# Patient Record
Sex: MALE | Race: BLACK OR AFRICAN AMERICAN | NOT HISPANIC OR LATINO | Employment: FULL TIME | URBAN - METROPOLITAN AREA
[De-identification: names, ages, dates, MRNs, and addresses within clinical notes are randomized per-mention and may not be internally consistent; named-entity substitution may affect disease eponyms.]

---

## 2019-08-20 ENCOUNTER — HOSPITAL ENCOUNTER (EMERGENCY)
Facility: HOSPITAL | Age: 34
Discharge: HOME | End: 2019-08-20
Attending: EMERGENCY MEDICINE | Admitting: EMERGENCY MEDICINE
Payer: COMMERCIAL

## 2019-08-20 VITALS
WEIGHT: 154.98 LBS | DIASTOLIC BLOOD PRESSURE: 60 MMHG | BODY MASS INDEX: 23.49 KG/M2 | HEART RATE: 70 BPM | TEMPERATURE: 99.8 F | HEIGHT: 68 IN | SYSTOLIC BLOOD PRESSURE: 117 MMHG | OXYGEN SATURATION: 96 % | RESPIRATION RATE: 14 BRPM

## 2019-08-20 DIAGNOSIS — B34.9 VIRAL ILLNESS: ICD-10-CM

## 2019-08-20 DIAGNOSIS — R50.9 FEVER: Primary | ICD-10-CM

## 2019-08-20 LAB
ANION GAP SERPL CALCULATED.3IONS-SCNC: 8 MMOL/L (ref 4–13)
BASOPHILS # BLD AUTO: 0.01 THOUSANDS/ΜL (ref 0–0.1)
BASOPHILS NFR BLD AUTO: 0 % (ref 0–1)
BUN SERPL-MCNC: 19 MG/DL (ref 5–25)
CALCIUM SERPL-MCNC: 9.3 MG/DL (ref 8.3–10.1)
CHLORIDE SERPL-SCNC: 100 MMOL/L (ref 100–108)
CO2 SERPL-SCNC: 30 MMOL/L (ref 21–32)
CREAT SERPL-MCNC: 1.58 MG/DL (ref 0.6–1.3)
EOSINOPHIL # BLD AUTO: 0 THOUSAND/ΜL (ref 0–0.61)
EOSINOPHIL NFR BLD AUTO: 0 % (ref 0–6)
ERYTHROCYTE [DISTWIDTH] IN BLOOD BY AUTOMATED COUNT: 13.8 % (ref 11.6–15.1)
GFR SERPL CREATININE-BSD FRML MDRD: 66 ML/MIN/1.73SQ M
GLUCOSE SERPL-MCNC: 88 MG/DL (ref 65–140)
HCT VFR BLD AUTO: 47.4 % (ref 36.5–49.3)
HGB BLD-MCNC: 17.1 G/DL (ref 12–17)
IMM GRANULOCYTES # BLD AUTO: 0.02 THOUSAND/UL (ref 0–0.2)
IMM GRANULOCYTES NFR BLD AUTO: 0 % (ref 0–2)
LYMPHOCYTES # BLD AUTO: 0.73 THOUSANDS/ΜL (ref 0.6–4.47)
LYMPHOCYTES NFR BLD AUTO: 10 % (ref 14–44)
MCH RBC QN AUTO: 30.1 PG (ref 26.8–34.3)
MCHC RBC AUTO-ENTMCNC: 36.1 G/DL (ref 31.4–37.4)
MCV RBC AUTO: 84 FL (ref 82–98)
MONOCYTES # BLD AUTO: 1.11 THOUSAND/ΜL (ref 0.17–1.22)
MONOCYTES NFR BLD AUTO: 16 % (ref 4–12)
NEUTROPHILS # BLD AUTO: 5.23 THOUSANDS/ΜL (ref 1.85–7.62)
NEUTS SEG NFR BLD AUTO: 74 % (ref 43–75)
NRBC BLD AUTO-RTO: 0 /100 WBCS
PLATELET # BLD AUTO: 130 THOUSANDS/UL (ref 149–390)
PMV BLD AUTO: 12.3 FL (ref 8.9–12.7)
POTASSIUM SERPL-SCNC: 4 MMOL/L (ref 3.5–5.3)
RBC # BLD AUTO: 5.68 MILLION/UL (ref 3.88–5.62)
SODIUM SERPL-SCNC: 138 MMOL/L (ref 136–145)
WBC # BLD AUTO: 7.1 THOUSAND/UL (ref 4.31–10.16)

## 2019-08-20 PROCEDURE — 96360 HYDRATION IV INFUSION INIT: CPT

## 2019-08-20 PROCEDURE — 85025 COMPLETE CBC W/AUTO DIFF WBC: CPT | Performed by: EMERGENCY MEDICINE

## 2019-08-20 PROCEDURE — 99284 EMERGENCY DEPT VISIT MOD MDM: CPT | Performed by: EMERGENCY MEDICINE

## 2019-08-20 PROCEDURE — 36415 COLL VENOUS BLD VENIPUNCTURE: CPT | Performed by: EMERGENCY MEDICINE

## 2019-08-20 PROCEDURE — 99283 EMERGENCY DEPT VISIT LOW MDM: CPT

## 2019-08-20 PROCEDURE — 80048 BASIC METABOLIC PNL TOTAL CA: CPT | Performed by: EMERGENCY MEDICINE

## 2019-08-20 RX ORDER — IBUPROFEN 600 MG/1
600 TABLET ORAL ONCE
Status: COMPLETED | OUTPATIENT
Start: 2019-08-20 | End: 2019-08-20

## 2019-08-20 RX ORDER — ACETAMINOPHEN 325 MG/1
975 TABLET ORAL ONCE
Status: COMPLETED | OUTPATIENT
Start: 2019-08-20 | End: 2019-08-20

## 2019-08-20 RX ADMIN — SODIUM CHLORIDE 1000 ML: 0.9 INJECTION, SOLUTION INTRAVENOUS at 15:58

## 2019-08-20 RX ADMIN — IBUPROFEN 600 MG: 600 TABLET ORAL at 15:58

## 2019-08-20 RX ADMIN — ACETAMINOPHEN 975 MG: 325 TABLET ORAL at 15:58

## 2019-08-20 NOTE — ED PROVIDER NOTES
History  Chief Complaint   Patient presents with    Fever - 9 weeks to 74 years     Patient states he has had a fever on and off since Sunday as high as 103 5  Patient began to complain of b/l ear pain today  30year old male presents to the ED for malaise and fever  The patient came to the ED with concern for otitis as his ears hurt  The patient has normal ear exam   He was evaluated with blood work and iv hydration and will be treated supportively  History provided by:  Patient   used: No    Fever - 9 weeks to 74 years   Temp source:  Subjective and oral  Severity:  Mild  Timing:  Intermittent  Progression:  Waxing and waning  Chronicity:  New  Relieved by:  Nothing  Worsened by:  Nothing  Ineffective treatments:  None tried  Associated symptoms: no chest pain, no confusion, no diarrhea, no dysuria, no rash, no somnolence and no vomiting    Risk factors: no contaminated food, no occupational exposure and no sick contacts        Prior to Admission Medications   Prescriptions Last Dose Informant Patient Reported? Taking? cephalexin (KEFLEX) 500 mg capsule   No No   Sig: Take 1 capsule by mouth 4 (four) times a day      Facility-Administered Medications: None       History reviewed  No pertinent past medical history  Past Surgical History:   Procedure Laterality Date    CYST REMOVAL         History reviewed  No pertinent family history  I have reviewed and agree with the history as documented  Social History     Tobacco Use    Smoking status: Never Smoker    Smokeless tobacco: Never Used   Substance Use Topics    Alcohol use: No    Drug use: No        Review of Systems   Constitutional: Positive for fever  Cardiovascular: Negative for chest pain  Gastrointestinal: Negative for diarrhea and vomiting  Genitourinary: Negative for dysuria  Skin: Negative for rash  Psychiatric/Behavioral: Negative for confusion     All other systems reviewed and are negative  Physical Exam  Physical Exam   Constitutional: He is oriented to person, place, and time  He appears well-developed and well-nourished  No distress  HENT:   Head: Normocephalic and atraumatic  Right Ear: External ear normal    Left Ear: External ear normal    Eyes: Conjunctivae and EOM are normal  Right eye exhibits no discharge  Left eye exhibits no discharge  No scleral icterus  Neck: Normal range of motion  Neck supple  No JVD present  No tracheal deviation present  No thyromegaly present  Cardiovascular: Normal rate and regular rhythm  Pulmonary/Chest: Effort normal and breath sounds normal  No stridor  No respiratory distress  He has no wheezes  He has no rales  Abdominal: Soft  Bowel sounds are normal  He exhibits no distension  There is no tenderness  Musculoskeletal: Normal range of motion  He exhibits no edema, tenderness or deformity  Neurological: He is alert and oriented to person, place, and time  No cranial nerve deficit  Coordination normal    Skin: Skin is warm and dry  He is not diaphoretic  Psychiatric: He has a normal mood and affect  His behavior is normal    Nursing note and vitals reviewed        Vital Signs  ED Triage Vitals   Temperature Pulse Respirations Blood Pressure SpO2   08/20/19 1415 08/20/19 1415 08/20/19 1415 08/20/19 1415 08/20/19 1415   100 3 °F (37 9 °C) 95 16 126/70 96 %      Temp Source Heart Rate Source Patient Position - Orthostatic VS BP Location FiO2 (%)   08/20/19 1415 08/20/19 1415 08/20/19 1516 08/20/19 1415 --   Oral Monitor Sitting Left arm       Pain Score       --                  Vitals:    08/20/19 1415 08/20/19 1516 08/20/19 1722   BP: 126/70 118/69 117/60   Pulse: 95 91 70   Patient Position - Orthostatic VS:  Sitting Lying         Visual Acuity      ED Medications  Medications   sodium chloride 0 9 % bolus 1,000 mL (0 mL Intravenous Stopped 8/20/19 1658)   acetaminophen (TYLENOL) tablet 975 mg (975 mg Oral Given 8/20/19 1558) ibuprofen (MOTRIN) tablet 600 mg (600 mg Oral Given 8/20/19 2308)       Diagnostic Studies  Results Reviewed     Procedure Component Value Units Date/Time    Basic metabolic panel [[id number]]  (Abnormal) Collected:  08/20/19 1556    Lab Status:  Final result Specimen:  Blood from Arm, Right Updated:  08/20/19 1614     Sodium 138 mmol/L      Potassium 4 0 mmol/L      Chloride 100 mmol/L      CO2 30 mmol/L      ANION GAP 8 mmol/L      BUN 19 mg/dL      Creatinine 1 58 mg/dL      Glucose 88 mg/dL      Calcium 9 3 mg/dL      eGFR 66 ml/min/1 73sq m     Narrative:       [place] guidelines for Chronic Kidney Disease (CKD):     Stage 1 with normal or high GFR (GFR > 90 mL/min/1 73 square meters)    Stage 2 Mild CKD (GFR = 60-89 mL/min/1 73 square meters)    Stage 3A Moderate CKD (GFR = 45-59 mL/min/1 73 square meters)    Stage 3B Moderate CKD (GFR = 30-44 mL/min/1 73 square meters)    Stage 4 Severe CKD (GFR = 15-29 mL/min/1 73 square meters)    Stage 5 End Stage CKD (GFR <15 mL/min/1 73 square meters)  Note: GFR calculation is accurate only with a steady state creatinine    CBC and differential [[id number]]  (Abnormal) Collected:  08/20/19 1556    Lab Status:  Final result Specimen:  Blood from Arm, Right Updated:  08/20/19 1607     WBC 7 10 Thousand/uL      RBC 5 68 Million/uL      Hemoglobin 17 1 g/dL      Hematocrit 47 4 %      MCV 84 fL      MCH 30 1 pg      MCHC 36 1 g/dL      RDW 13 8 %      MPV 12 3 fL      Platelets 157 Thousands/uL      nRBC 0 /100 WBCs      Neutrophils Relative 74 %      Immat GRANS % 0 %      Lymphocytes Relative 10 %      Monocytes Relative 16 %      Eosinophils Relative 0 %      Basophils Relative 0 %      Neutrophils Absolute 5 23 Thousands/µL      Immature Grans Absolute 0 02 Thousand/uL      Lymphocytes Absolute 0 73 Thousands/µL      Monocytes Absolute 1 11 Thousand/µL      Eosinophils Absolute 0 00 Thousand/µL      Basophils Absolute 0 01 Thousands/µL No orders to display              Procedures  Procedures       ED Course                               MDM  Number of Diagnoses or Management Options  Fever: new and requires workup  Viral illness: new and requires workup     Amount and/or Complexity of Data Reviewed  Clinical lab tests: ordered and reviewed  Decide to obtain previous medical records or to obtain history from someone other than the patient: yes  Review and summarize past medical records: yes    Patient Progress  Patient progress: stable      Disposition  Final diagnoses:   Fever   Viral illness     Time reflects when diagnosis was documented in both MDM as applicable and the Disposition within this note     Time User Action Codes Description Comment    8/20/2019  4:16 PM Rin Ennis [R50 9] Fever     8/20/2019  4:16 PM Rin Ennis [B34 9] Viral illness       ED Disposition     ED Disposition Condition Date/Time Comment    Discharge Stable Tue Aug 20, 2019  4:16 PM Tae Robles discharge to home/self care  Follow-up Information     Follow up With Specialties Details Why Contact Info    Allison Pedroza MD [facility] Medicine   [address]  [place] [address]  [phone]            Discharge Medication List as of 8/20/2019  4:16 PM      CONTINUE these medications which have NOT CHANGED    Details   cephalexin (KEFLEX) 500 mg capsule Take 1 capsule by mouth 4 (four) times a day, Starting 11/1/2016, Until Discontinued, Print           No discharge procedures on file      ED Provider  Electronically Signed by           Manpreet Holden DO  08/22/19 5207

## 2019-08-20 NOTE — ED NOTES
Patient resting in bed eyes closed resp easy non labored       Raquel Brewster, HOLLIE  08/20/19 8306

## 2020-08-06 ENCOUNTER — HOSPITAL ENCOUNTER (EMERGENCY)
Facility: HOSPITAL | Age: 35
Discharge: HOME | End: 2020-08-06
Attending: EMERGENCY MEDICINE
Payer: COMMERCIAL

## 2020-08-06 ENCOUNTER — APPOINTMENT (EMERGENCY)
Dept: RADIOLOGY | Facility: HOSPITAL | Age: 35
End: 2020-08-06

## 2020-08-06 VITALS
HEART RATE: 57 BPM | OXYGEN SATURATION: 96 % | DIASTOLIC BLOOD PRESSURE: 61 MMHG | WEIGHT: 165 LBS | BODY MASS INDEX: 25.09 KG/M2 | SYSTOLIC BLOOD PRESSURE: 136 MMHG | TEMPERATURE: 97.9 F | RESPIRATION RATE: 15 BRPM

## 2020-08-06 DIAGNOSIS — S39.012A STRAIN OF LUMBAR PARASPINOUS MUSCLE, INITIAL ENCOUNTER: Primary | ICD-10-CM

## 2020-08-06 PROCEDURE — 72100 X-RAY EXAM L-S SPINE 2/3 VWS: CPT

## 2020-08-06 PROCEDURE — 99283 EMERGENCY DEPT VISIT LOW MDM: CPT

## 2020-08-06 PROCEDURE — 99282 EMERGENCY DEPT VISIT SF MDM: CPT | Performed by: PHYSICIAN ASSISTANT

## 2020-08-06 NOTE — DISCHARGE INSTRUCTIONS
Take Tylenol 650mg and ibuprofen 600mg every 6 hours as needed for pain  Use lidocaine patches daily (12 hours on, 12 hours off)  Take your muscle relaxer as needed for spasms  Follow-up with your workman's comp occupational health resources  Return to ER with any worsening symptoms
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
## 2020-08-06 NOTE — Clinical Note
Rich Griffin was seen and treated in our emergency department on 8/6/2020  Diagnosis:     Britany Owens  may return to work on return date  He may return on 08/10/2020  If you have any questions or concerns, please don't hesitate to call        Mor Puente PA-C    ______________________________           _______________          _______________  Hospital Representative                              Date                                Time

## 2022-12-11 ENCOUNTER — HOSPITAL ENCOUNTER (EMERGENCY)
Facility: HOSPITAL | Age: 37
Discharge: HOME | End: 2022-12-11
Attending: EMERGENCY MEDICINE

## 2022-12-11 VITALS
RESPIRATION RATE: 18 BRPM | DIASTOLIC BLOOD PRESSURE: 56 MMHG | OXYGEN SATURATION: 98 % | HEART RATE: 62 BPM | TEMPERATURE: 97.8 F | SYSTOLIC BLOOD PRESSURE: 116 MMHG

## 2022-12-11 DIAGNOSIS — H61.892 SWELLING OF EXTERNAL EAR, LEFT: Primary | ICD-10-CM

## 2022-12-11 RX ORDER — LIDOCAINE 40 MG/G
CREAM TOPICAL ONCE
Status: COMPLETED | OUTPATIENT
Start: 2022-12-11 | End: 2022-12-11

## 2022-12-11 RX ORDER — CEPHALEXIN 250 MG/1
500 CAPSULE ORAL ONCE
Status: COMPLETED | OUTPATIENT
Start: 2022-12-11 | End: 2022-12-11

## 2022-12-11 RX ORDER — CEPHALEXIN 500 MG/1
500 CAPSULE ORAL EVERY 6 HOURS SCHEDULED
Qty: 20 CAPSULE | Refills: 0 | Status: SHIPPED | OUTPATIENT
Start: 2022-12-11 | End: 2022-12-11 | Stop reason: SDUPTHER

## 2022-12-11 RX ORDER — CEPHALEXIN 500 MG/1
500 CAPSULE ORAL EVERY 6 HOURS SCHEDULED
Qty: 20 CAPSULE | Refills: 0 | Status: SHIPPED | OUTPATIENT
Start: 2022-12-11 | End: 2022-12-16

## 2022-12-11 RX ADMIN — LIDOCAINE: 40 CREAM TOPICAL at 22:31

## 2022-12-11 RX ADMIN — CEPHALEXIN 500 MG: 250 CAPSULE ORAL at 23:00

## 2022-12-12 NOTE — ED PROVIDER NOTES
History  Chief Complaint   Patient presents with   • Medical Problem     Pt reports ambulatory to triage with a  c/o left ear swelling, to the top part of his ear for 3 weeks  Denies known injury  The patient is a 30 y o  male with no significant past medical history who presents for evaluation of left ear swelling  He reports he first noticed a small bump in the area approximately 3 weeks ago and it has continued to grow in size  It is minimally painful when touching/squeezing the area  Denies F/C, redness, drainage, itching, LAD, injury to the ear, or recent ear piercing  Prior to Admission Medications   Prescriptions Last Dose Informant Patient Reported? Taking? cephalexin (KEFLEX) 500 mg capsule   No No   Sig: Take 1 capsule by mouth 4 (four) times a day      Facility-Administered Medications: None       History reviewed  No pertinent past medical history  Past Surgical History:   Procedure Laterality Date   • CYST REMOVAL         History reviewed  No pertinent family history  I have reviewed and agree with the history as documented  E-Cigarette/Vaping   • E-Cigarette Use Never User      E-Cigarette/Vaping Substances   • Nicotine No    • THC No    • CBD No    • Flavoring No    • Other No    • Unknown No      Social History     Tobacco Use   • Smoking status: Never   • Smokeless tobacco: Never   Vaping Use   • Vaping Use: Never used   Substance Use Topics   • Alcohol use: No   • Drug use: No       Review of Systems   Constitutional: Negative for chills and fever  HENT: Positive for ear pain  Negative for congestion, ear discharge, facial swelling, rhinorrhea and sore throat  Eyes: Negative for pain and visual disturbance  Respiratory: Negative for cough and shortness of breath  Cardiovascular: Negative for chest pain and palpitations  Gastrointestinal: Negative for abdominal pain, constipation, diarrhea, nausea and vomiting  Genitourinary: Negative for dysuria and hematuria  Musculoskeletal: Negative for arthralgias, back pain, myalgias, neck pain and neck stiffness  Skin: Negative for color change, rash and wound  Neurological: Negative for seizures and syncope  All other systems reviewed and are negative  Physical Exam  Physical Exam  Vitals and nursing note reviewed  Constitutional:       General: He is awake  He is not in acute distress  Appearance: Normal appearance  He is well-developed and normal weight  He is not ill-appearing  HENT:      Head: Normocephalic and atraumatic  Right Ear: Hearing, ear canal and external ear normal  There is impacted cerumen  Left Ear: Hearing, tympanic membrane and ear canal normal       Ears:        Comments: Large amount of cerumen in the EACs bilaterally, right EAC is almost completely occluded and the TM is not well visualized  Nose: Nose normal  No congestion or rhinorrhea  Right Nostril: No occlusion  Left Nostril: No occlusion  Mouth/Throat:      Lips: Pink  No lesions  Mouth: Mucous membranes are moist       Pharynx: Oropharynx is clear  Uvula midline  Eyes:      General: Lids are normal  Gaze aligned appropriately  Conjunctiva/sclera: Conjunctivae normal       Pupils: Pupils are equal, round, and reactive to light  Cardiovascular:      Rate and Rhythm: Normal rate and regular rhythm  Pulmonary:      Effort: Pulmonary effort is normal  No respiratory distress  Musculoskeletal:      Cervical back: Full passive range of motion without pain and neck supple  Lymphadenopathy:      Head:      Right side of head: No preauricular or posterior auricular adenopathy  Left side of head: No preauricular or posterior auricular adenopathy  Cervical: No cervical adenopathy  Skin:     General: Skin is warm  Capillary Refill: Capillary refill takes less than 2 seconds  Coloration: Skin is not cyanotic, jaundiced or pale        Findings: No erythema, lesion, petechiae, rash or wound  Neurological:      Mental Status: He is alert and oriented to person, place, and time  Psychiatric:         Attention and Perception: Attention normal          Mood and Affect: Mood normal          Speech: Speech normal          Behavior: Behavior normal  Behavior is cooperative  Vital Signs  ED Triage Vitals [12/11/22 2109]   Temperature Pulse Respirations Blood Pressure SpO2   97 8 °F (36 6 °C) 62 18 116/56 98 %      Temp Source Heart Rate Source Patient Position - Orthostatic VS BP Location FiO2 (%)   Tympanic Monitor Sitting Left arm --      Pain Score       --           Vitals:    12/11/22 2109   BP: 116/56   Pulse: 62   Patient Position - Orthostatic VS: Sitting       ED Medications  Medications   lidocaine (LMX) 4 % cream ( Topical Given 12/11/22 2231)   cephalexin (KEFLEX) capsule 500 mg (500 mg Oral Given 12/11/22 2300)       Diagnostic Studies  Results Reviewed     None                 No orders to display              Procedures  Incision and drain    Date/Time: 12/11/2022 10:42 PM  Performed by: Gil Palumbo PA-C  Authorized by: Gil Palumbo PA-C   Universal Protocol:  Procedure performed by:  Risks and benefits: risks, benefits and alternatives were discussed  Consent given by: patient  Time out: Immediately prior to procedure a "time out" was called to verify the correct patient, procedure, equipment, support staff and site/side marked as required    Timeout called at: 12/11/2022 10:42 PM   Patient understanding: patient states understanding of the procedure being performed  Patient consent: the patient's understanding of the procedure matches consent given  Procedure consent: procedure consent matches procedure scheduled  Patient identity confirmed: verbally with patient and arm band      Patient location:  ED  Location:     Type:  Fluid collection    Size:  2 cm x 2 cm    Location:  Head/neck    Head/neck location:  L external ear  Pre-procedure details:     Skin preparation:  Betadine  Anesthesia (see MAR for exact dosages): Anesthesia method:  None  Procedure details:     Complexity:  Simple    Incision types: Other (comment) (Needle decompression)    Aspiration type: puncture aspiration      Aspiration type comment:  18 gauge needle    Drainage:  Serosanguinous    Drainage amount:  Scant    Wound treatment:  Wound left open    Packing materials:  None  Post-procedure details:     Patient tolerance of procedure: Tolerated well, no immediate complications         ED Course         MDM  Number of Diagnoses or Management Options  Swelling of external ear, left: new and does not require workup  Diagnosis management comments: Patient presents for evaluation of left ear swelling  On exam there is a minimally tender 2cm x 2cm area of fluctuance in the antihelix, extending into the triangular fossa  The area is not red or warm and no purulent drainage was expressed on palpation  Differential diagnosis includes but is not limited to cyst, perichondritis, cellulitis, or an auricular hematoma  Topical lidocaine was applied to the area and then the area was drained with an 18-gauge needle  Patient tolerated the procedure well  He was placed on antibiotic prophylaxis and referred to ENT for further management  All of the patient and his wife's questions were answered  Strict return precautions discussed and he verbalized understanding  Follow up with ENT         Amount and/or Complexity of Data Reviewed  Discuss the patient with other providers: yes    Risk of Complications, Morbidity, and/or Mortality  Presenting problems: low  Diagnostic procedures: minimal  Management options: low    Patient Progress  Patient progress: stable      Disposition  Final diagnoses:   Swelling of external ear, left     Time reflects when diagnosis was documented in both MDM as applicable and the Disposition within this note     Time User Action Codes Description Comment    12/11/2022 10:47 PM Amanda Linares [facility] Add [[id number] [id number]] Swelling of external ear, left       ED Disposition     ED Disposition   Discharge    Condition   Stable    Date/Time   Sun Dec 11, 2022 10:47 PM    Comment   Larry Traylor discharge to home/self care  Follow-up Information     Follow up With Specialties Details Why Contact Info    Ambrocio Jacobson MD [facility]   [address]  [place] [id number] [phone]      Omid Griffiths MD Otolaryngology   [address] [place] [id number]  [phone]            Discharge Medication List as of 12/11/2022 10:56 PM      CONTINUE these medications which have CHANGED    Details   !! cephalexin (KEFLEX) 500 mg capsule Take 1 capsule (500 mg total) by mouth every 6 (six) hours for 5 days, Starting Sun 12/11/2022, Until Fri 12/16/2022, Normal       !! - Potential duplicate medications found  Please discuss with provider  CONTINUE these medications which have NOT CHANGED    Details   !! cephalexin (KEFLEX) 500 mg capsule Take 1 capsule by mouth 4 (four) times a day, Starting 11/1/2016, Until Discontinued, Print       !! - Potential duplicate medications found  Please discuss with provider                PDMP Review     None          ED Provider  Electronically Signed by           Chava Pavon PA-C  12/14/22 0265

## 2023-01-10 ENCOUNTER — HOSPITAL ENCOUNTER (EMERGENCY)
Facility: HOSPITAL | Age: 38
Discharge: HOME | End: 2023-01-10
Attending: EMERGENCY MEDICINE

## 2023-01-10 VITALS
WEIGHT: 165 LBS | DIASTOLIC BLOOD PRESSURE: 70 MMHG | OXYGEN SATURATION: 99 % | RESPIRATION RATE: 16 BRPM | TEMPERATURE: 98.2 F | BODY MASS INDEX: 25.09 KG/M2 | HEART RATE: 79 BPM | SYSTOLIC BLOOD PRESSURE: 117 MMHG

## 2023-01-10 DIAGNOSIS — M25.521 ELBOW PAIN, RIGHT: Primary | ICD-10-CM

## 2023-01-10 DIAGNOSIS — M70.21 OLECRANON BURSITIS OF RIGHT ELBOW: ICD-10-CM

## 2023-01-10 RX ORDER — AMOXICILLIN AND CLAVULANATE POTASSIUM 875; 125 MG/1; MG/1
1 TABLET, FILM COATED ORAL EVERY 12 HOURS
Qty: 14 TABLET | Refills: 0 | Status: SHIPPED | OUTPATIENT
Start: 2023-01-10 | End: 2023-01-17

## 2023-01-10 RX ORDER — PREDNISONE 20 MG/1
40 TABLET ORAL DAILY
Qty: 10 TABLET | Refills: 0 | Status: SHIPPED | OUTPATIENT
Start: 2023-01-10

## 2023-01-10 NOTE — ED PROVIDER NOTES
History  Chief Complaint   Patient presents with   • Elbow Pain     Pt c/o R elbow pain since this morning that radiates down the arm  Pt states got a burn a few weeks ago on the arm, but has since healed  Elbow is painful to touch, warm and swollen     RHD  who woke up this morning with R elbow pain, swelling, warmth and redness  Pain posterior to elbow worsened with touch  No pain within the elbow joint with flexion, extension, pronation or supination  No systemic illness  Prior to Admission Medications   Prescriptions Last Dose Informant Patient Reported? Taking? cephalexin (KEFLEX) 500 mg capsule   No No   Sig: Take 1 capsule by mouth 4 (four) times a day      Facility-Administered Medications: None       History reviewed  No pertinent past medical history  Past Surgical History:   Procedure Laterality Date   • CYST REMOVAL         History reviewed  No pertinent family history  I have reviewed and agree with the history as documented  E-Cigarette/Vaping   • E-Cigarette Use Never User      E-Cigarette/Vaping Substances   • Nicotine No    • THC No    • CBD No    • Flavoring No    • Other No    • Unknown No      Social History     Tobacco Use   • Smoking status: Never   • Smokeless tobacco: Never   Vaping Use   • Vaping Use: Never used   Substance Use Topics   • Alcohol use: No   • Drug use: No       Review of Systems   Musculoskeletal: Positive for arthralgias  All other systems reviewed and are negative  Physical Exam  Physical Exam  Vitals and nursing note reviewed  Constitutional:       General: He is not in acute distress  Appearance: Normal appearance  He is well-developed  He is not ill-appearing, toxic-appearing or diaphoretic  HENT:      Head: Normocephalic and atraumatic  Eyes:      General:         Right eye: No discharge  Left eye: No discharge  Conjunctiva/sclera: Conjunctivae normal       Pupils: Pupils are equal, round, and reactive to light  Neck:      Vascular: No JVD  Cardiovascular:      Pulses: Normal pulses  Pulmonary:      Breath sounds: No stridor  Musculoskeletal:         General: Swelling and tenderness present  No deformity  Normal range of motion  Cervical back: Normal range of motion and neck supple  Comments: Posterior to elbow there is erythema and warmth of the skin without crepitus  Centrally in this area there are two small 2-3mm superficial ulcerations of the skin without drainage  Painless active ROM of the R elbow  Skin:     General: Skin is warm and dry  Capillary Refill: Capillary refill takes less than 2 seconds  Coloration: Skin is not pale  Findings: No erythema or rash  Neurological:      General: No focal deficit present  Mental Status: He is alert and oriented to person, place, and time  Cranial Nerves: No cranial nerve deficit  Sensory: No sensory deficit  Motor: No weakness or abnormal muscle tone        Coordination: Coordination normal          Vital Signs  ED Triage Vitals [01/10/23 0856]   Temperature Pulse Respirations Blood Pressure SpO2   98 2 °F (36 8 °C) 79 16 117/70 99 %      Temp Source Heart Rate Source Patient Position - Orthostatic VS BP Location FiO2 (%)   Oral Monitor Sitting Left arm --      Pain Score       7           Vitals:    01/10/23 0856   BP: 117/70   Pulse: 79   Patient Position - Orthostatic VS: Sitting         Visual Acuity      ED Medications  Medications - No data to display    Diagnostic Studies  Results Reviewed     None                 No orders to display              Procedures  Procedures         ED Course                                             MDM    Disposition  Final diagnoses:   Elbow pain, right   Olecranon bursitis of right elbow     Time reflects when diagnosis was documented in both MDM as applicable and the Disposition within this note     Time User Action Codes Description Comment    1/10/2023  9:06 AM Sydni Frances Add [M25 [id number]] Elbow pain, right     1/10/2023  9:06 AM Brayan Adams Add [M70 21] Olecranon bursitis of right elbow       ED Disposition     ED Disposition   Discharge    Condition   Stable    Date/Time   Tue Jos 10, 2023  9:06 AM    Shavonne Edwards discharge to home/self care                 Follow-up Information     Follow up With Specialties Details Why Contact Info Additional Information    [facility] Emergency Medicine  If symptoms worsen [address] [place] [address] Emergency Department, [address] [place] [address], [facility]          Patient's Medications   Discharge Prescriptions    AMOXICILLIN-CLAVULANATE (AUGMENTIN) 875-125 MG PER TABLET    Take 1 tablet by mouth every 12 (twelve) hours for 7 days       Start Date: 1/10/2023 End Date: 1/17/2023       Order Dose: 1 tablet       Quantity: 14 tablet    Refills: 0    PREDNISONE 20 MG TABLET    Take 2 tablets (40 mg total) by mouth daily       Start Date: 1/10/2023 End Date: --       Order Dose: 40 mg       Quantity: 10 tablet    Refills: 0           PDMP Review     None          ED Provider  Electronically Signed by           Katarzyna Ng MD  01/10/23 7532

## 2025-02-14 ENCOUNTER — HOSPITAL ENCOUNTER (OUTPATIENT)
Facility: HOSPITAL | Age: 40
Setting detail: OUTPATIENT SURGERY
Discharge: HOME | End: 2025-02-15
Attending: SURGERY | Admitting: SURGERY
Payer: COMMERCIAL

## 2025-02-14 ENCOUNTER — ANESTHESIA (EMERGENCY)
Dept: PERIOP | Facility: HOSPITAL | Age: 40
End: 2025-02-14
Payer: COMMERCIAL

## 2025-02-14 ENCOUNTER — HOSPITAL ENCOUNTER (EMERGENCY)
Facility: HOSPITAL | Age: 40
End: 2025-02-14
Attending: EMERGENCY MEDICINE
Payer: COMMERCIAL

## 2025-02-14 ENCOUNTER — ANESTHESIA EVENT (EMERGENCY)
Dept: PERIOP | Facility: HOSPITAL | Age: 40
End: 2025-02-14
Payer: COMMERCIAL

## 2025-02-14 VITALS
OXYGEN SATURATION: 100 % | HEIGHT: 67 IN | DIASTOLIC BLOOD PRESSURE: 70 MMHG | HEART RATE: 64 BPM | BODY MASS INDEX: 25.43 KG/M2 | TEMPERATURE: 97.7 F | RESPIRATION RATE: 17 BRPM | SYSTOLIC BLOOD PRESSURE: 146 MMHG | WEIGHT: 162 LBS

## 2025-02-14 DIAGNOSIS — E87.6 HYPOKALEMIA: ICD-10-CM

## 2025-02-14 DIAGNOSIS — K62.3 RECTAL PROLAPSE: Primary | ICD-10-CM

## 2025-02-14 LAB
ABO GROUP BLD: NORMAL
ABO GROUP BLD: NORMAL
ANION GAP SERPL CALCULATED.3IONS-SCNC: 6 MMOL/L (ref 4–13)
BASOPHILS # BLD MANUAL: 0 THOUSAND/UL (ref 0–0.1)
BASOPHILS NFR MAR MANUAL: 0 % (ref 0–1)
BLD GP AB SCN SERPL QL: NEGATIVE
BUN SERPL-MCNC: 18 MG/DL (ref 5–25)
CALCIUM SERPL-MCNC: 8.7 MG/DL (ref 8.4–10.2)
CHLORIDE SERPL-SCNC: 108 MMOL/L (ref 96–108)
CO2 SERPL-SCNC: 28 MMOL/L (ref 21–32)
CREAT SERPL-MCNC: 1.16 MG/DL (ref 0.6–1.3)
EOSINOPHIL # BLD MANUAL: 0.17 THOUSAND/UL (ref 0–0.4)
EOSINOPHIL NFR BLD MANUAL: 2 % (ref 0–6)
ERYTHROCYTE [DISTWIDTH] IN BLOOD BY AUTOMATED COUNT: 13.5 % (ref 11.6–15.1)
GFR SERPL CREATININE-BSD FRML MDRD: 80 ML/MIN/1.73SQ M
GLUCOSE SERPL-MCNC: 103 MG/DL (ref 65–140)
HCT VFR BLD AUTO: 39.8 % (ref 36.5–49.3)
HGB BLD-MCNC: 14.2 G/DL (ref 12–17)
LYMPHOCYTES # BLD AUTO: 3.23 THOUSAND/UL (ref 0.6–4.47)
LYMPHOCYTES # BLD AUTO: 37 % (ref 14–44)
MCH RBC QN AUTO: 29.4 PG (ref 26.8–34.3)
MCHC RBC AUTO-ENTMCNC: 35.7 G/DL (ref 31.4–37.4)
MCV RBC AUTO: 82 FL (ref 82–98)
MONOCYTES # BLD AUTO: 0.42 THOUSAND/UL (ref 0–1.22)
MONOCYTES NFR BLD: 5 % (ref 4–12)
NEUTROPHILS # BLD MANUAL: 4.67 THOUSAND/UL (ref 1.85–7.62)
NEUTS SEG NFR BLD AUTO: 55 % (ref 43–75)
PLATELET # BLD AUTO: 157 THOUSANDS/UL (ref 149–390)
PLATELET BLD QL SMEAR: ADEQUATE
PMV BLD AUTO: 11.4 FL (ref 8.9–12.7)
POTASSIUM SERPL-SCNC: 3.2 MMOL/L (ref 3.5–5.3)
RBC # BLD AUTO: 4.83 MILLION/UL (ref 3.88–5.62)
RBC MORPH BLD: NORMAL
RH BLD: POSITIVE
RH BLD: POSITIVE
SODIUM SERPL-SCNC: 142 MMOL/L (ref 135–147)
SPECIMEN EXPIRATION DATE: NORMAL
VARIANT LYMPHS # BLD AUTO: 1 %
WBC # BLD AUTO: 8.49 THOUSAND/UL (ref 4.31–10.16)

## 2025-02-14 PROCEDURE — 86901 BLOOD TYPING SEROLOGIC RH(D): CPT | Performed by: PHYSICIAN ASSISTANT

## 2025-02-14 PROCEDURE — 99283 EMERGENCY DEPT VISIT LOW MDM: CPT

## 2025-02-14 PROCEDURE — 80048 BASIC METABOLIC PNL TOTAL CA: CPT | Performed by: PHYSICIAN ASSISTANT

## 2025-02-14 PROCEDURE — C1765 ADHESION BARRIER: HCPCS | Performed by: SURGERY

## 2025-02-14 PROCEDURE — 45900 REDUCTION OF RECTAL PROLAPSE: CPT | Performed by: SURGERY

## 2025-02-14 PROCEDURE — 85027 COMPLETE CBC AUTOMATED: CPT | Performed by: PHYSICIAN ASSISTANT

## 2025-02-14 PROCEDURE — 36415 COLL VENOUS BLD VENIPUNCTURE: CPT | Performed by: PHYSICIAN ASSISTANT

## 2025-02-14 PROCEDURE — 96374 THER/PROPH/DIAG INJ IV PUSH: CPT

## 2025-02-14 PROCEDURE — 99285 EMERGENCY DEPT VISIT HI MDM: CPT | Performed by: EMERGENCY MEDICINE

## 2025-02-14 PROCEDURE — 86900 BLOOD TYPING SEROLOGIC ABO: CPT | Performed by: PHYSICIAN ASSISTANT

## 2025-02-14 PROCEDURE — 96375 TX/PRO/DX INJ NEW DRUG ADDON: CPT

## 2025-02-14 PROCEDURE — NC001 PR NO CHARGE: Performed by: SURGERY

## 2025-02-14 PROCEDURE — 86850 RBC ANTIBODY SCREEN: CPT | Performed by: PHYSICIAN ASSISTANT

## 2025-02-14 PROCEDURE — 96365 THER/PROPH/DIAG IV INF INIT: CPT

## 2025-02-14 PROCEDURE — 85007 BL SMEAR W/DIFF WBC COUNT: CPT | Performed by: PHYSICIAN ASSISTANT

## 2025-02-14 PROCEDURE — 96366 THER/PROPH/DIAG IV INF ADDON: CPT

## 2025-02-14 PROCEDURE — 96367 TX/PROPH/DG ADDL SEQ IV INF: CPT

## 2025-02-14 RX ORDER — POTASSIUM CHLORIDE 1500 MG/1
40 TABLET, EXTENDED RELEASE ORAL ONCE
Status: COMPLETED | OUTPATIENT
Start: 2025-02-14 | End: 2025-02-14

## 2025-02-14 RX ORDER — ONDANSETRON 2 MG/ML
INJECTION INTRAMUSCULAR; INTRAVENOUS AS NEEDED
Status: DISCONTINUED | OUTPATIENT
Start: 2025-02-14 | End: 2025-02-14

## 2025-02-14 RX ORDER — ACETAMINOPHEN 325 MG/1
650 TABLET ORAL EVERY 6 HOURS SCHEDULED
Status: DISCONTINUED | OUTPATIENT
Start: 2025-02-14 | End: 2025-02-14

## 2025-02-14 RX ORDER — OXYCODONE HYDROCHLORIDE 10 MG/1
10 TABLET ORAL EVERY 4 HOURS PRN
Refills: 0 | Status: DISCONTINUED | OUTPATIENT
Start: 2025-02-14 | End: 2025-02-14

## 2025-02-14 RX ORDER — ONDANSETRON 2 MG/ML
4 INJECTION INTRAMUSCULAR; INTRAVENOUS ONCE
Status: COMPLETED | OUTPATIENT
Start: 2025-02-14 | End: 2025-02-14

## 2025-02-14 RX ORDER — HYDROMORPHONE HCL/PF 1 MG/ML
0.5 SYRINGE (ML) INJECTION ONCE
Status: COMPLETED | OUTPATIENT
Start: 2025-02-14 | End: 2025-02-14

## 2025-02-14 RX ORDER — SODIUM CHLORIDE, SODIUM LACTATE, POTASSIUM CHLORIDE, CALCIUM CHLORIDE 600; 310; 30; 20 MG/100ML; MG/100ML; MG/100ML; MG/100ML
125 INJECTION, SOLUTION INTRAVENOUS CONTINUOUS
Status: DISCONTINUED | OUTPATIENT
Start: 2025-02-14 | End: 2025-02-14

## 2025-02-14 RX ORDER — LIDOCAINE HYDROCHLORIDE 10 MG/ML
INJECTION, SOLUTION EPIDURAL; INFILTRATION; INTRACAUDAL; PERINEURAL AS NEEDED
Status: DISCONTINUED | OUTPATIENT
Start: 2025-02-14 | End: 2025-02-14 | Stop reason: HOSPADM

## 2025-02-14 RX ORDER — HEPARIN SODIUM 5000 [USP'U]/ML
5000 INJECTION, SOLUTION INTRAVENOUS; SUBCUTANEOUS EVERY 8 HOURS SCHEDULED
Status: DISCONTINUED | OUTPATIENT
Start: 2025-02-14 | End: 2025-02-15 | Stop reason: HOSPADM

## 2025-02-14 RX ORDER — ONDANSETRON 2 MG/ML
4 INJECTION INTRAMUSCULAR; INTRAVENOUS ONCE AS NEEDED
Status: DISCONTINUED | OUTPATIENT
Start: 2025-02-14 | End: 2025-02-14 | Stop reason: HOSPADM

## 2025-02-14 RX ORDER — FENTANYL CITRATE 50 UG/ML
INJECTION, SOLUTION INTRAMUSCULAR; INTRAVENOUS AS NEEDED
Status: DISCONTINUED | OUTPATIENT
Start: 2025-02-14 | End: 2025-02-14

## 2025-02-14 RX ORDER — ACETAMINOPHEN 10 MG/ML
1000 INJECTION, SOLUTION INTRAVENOUS ONCE
Status: COMPLETED | OUTPATIENT
Start: 2025-02-14 | End: 2025-02-14

## 2025-02-14 RX ORDER — LIDOCAINE HYDROCHLORIDE 10 MG/ML
INJECTION, SOLUTION EPIDURAL; INFILTRATION; INTRACAUDAL; PERINEURAL AS NEEDED
Status: DISCONTINUED | OUTPATIENT
Start: 2025-02-14 | End: 2025-02-14

## 2025-02-14 RX ORDER — HYDROMORPHONE HCL IN WATER/PF 6 MG/30 ML
0.2 PATIENT CONTROLLED ANALGESIA SYRINGE INTRAVENOUS
Status: DISCONTINUED | OUTPATIENT
Start: 2025-02-14 | End: 2025-02-14

## 2025-02-14 RX ORDER — METOCLOPRAMIDE HYDROCHLORIDE 5 MG/ML
10 INJECTION INTRAMUSCULAR; INTRAVENOUS ONCE AS NEEDED
Status: DISCONTINUED | OUTPATIENT
Start: 2025-02-14 | End: 2025-02-14 | Stop reason: HOSPADM

## 2025-02-14 RX ORDER — OXYCODONE HYDROCHLORIDE 5 MG/1
5 TABLET ORAL EVERY 4 HOURS PRN
Refills: 0 | Status: DISCONTINUED | OUTPATIENT
Start: 2025-02-14 | End: 2025-02-15 | Stop reason: HOSPADM

## 2025-02-14 RX ORDER — OXYCODONE HYDROCHLORIDE 10 MG/1
10 TABLET ORAL EVERY 4 HOURS PRN
Refills: 0 | Status: DISCONTINUED | OUTPATIENT
Start: 2025-02-14 | End: 2025-02-15 | Stop reason: HOSPADM

## 2025-02-14 RX ORDER — METRONIDAZOLE 500 MG/100ML
INJECTION, SOLUTION INTRAVENOUS CONTINUOUS PRN
Status: DISCONTINUED | OUTPATIENT
Start: 2025-02-14 | End: 2025-02-14

## 2025-02-14 RX ORDER — CEFAZOLIN SODIUM 2 G/50ML
2000 SOLUTION INTRAVENOUS
Status: COMPLETED | OUTPATIENT
Start: 2025-02-14 | End: 2025-02-14

## 2025-02-14 RX ORDER — CEFAZOLIN SODIUM 2 G/50ML
SOLUTION INTRAVENOUS AS NEEDED
Status: DISCONTINUED | OUTPATIENT
Start: 2025-02-14 | End: 2025-02-14

## 2025-02-14 RX ORDER — OXYCODONE HYDROCHLORIDE 5 MG/1
5 TABLET ORAL EVERY 4 HOURS PRN
Refills: 0 | Status: DISCONTINUED | OUTPATIENT
Start: 2025-02-14 | End: 2025-02-14

## 2025-02-14 RX ORDER — FENTANYL CITRATE/PF 50 MCG/ML
25 SYRINGE (ML) INJECTION
Refills: 0 | Status: DISCONTINUED | OUTPATIENT
Start: 2025-02-14 | End: 2025-02-14 | Stop reason: HOSPADM

## 2025-02-14 RX ORDER — MIDAZOLAM HYDROCHLORIDE 2 MG/2ML
INJECTION, SOLUTION INTRAMUSCULAR; INTRAVENOUS AS NEEDED
Status: DISCONTINUED | OUTPATIENT
Start: 2025-02-14 | End: 2025-02-14

## 2025-02-14 RX ORDER — DEXAMETHASONE SODIUM PHOSPHATE 10 MG/ML
INJECTION, SOLUTION INTRAMUSCULAR; INTRAVENOUS AS NEEDED
Status: DISCONTINUED | OUTPATIENT
Start: 2025-02-14 | End: 2025-02-14

## 2025-02-14 RX ORDER — ACETAMINOPHEN 325 MG/1
650 TABLET ORAL EVERY 8 HOURS SCHEDULED
Status: DISCONTINUED | OUTPATIENT
Start: 2025-02-14 | End: 2025-02-15 | Stop reason: HOSPADM

## 2025-02-14 RX ORDER — METRONIDAZOLE 500 MG/100ML
500 INJECTION, SOLUTION INTRAVENOUS
Status: COMPLETED | OUTPATIENT
Start: 2025-02-14 | End: 2025-02-14

## 2025-02-14 RX ORDER — PROPOFOL 10 MG/ML
INJECTION, EMULSION INTRAVENOUS AS NEEDED
Status: DISCONTINUED | OUTPATIENT
Start: 2025-02-14 | End: 2025-02-14

## 2025-02-14 RX ORDER — ALBUMIN HUMAN 50 G/1000ML
SOLUTION INTRAVENOUS CONTINUOUS PRN
Status: DISCONTINUED | OUTPATIENT
Start: 2025-02-14 | End: 2025-02-14

## 2025-02-14 RX ORDER — ROCURONIUM BROMIDE 10 MG/ML
INJECTION, SOLUTION INTRAVENOUS AS NEEDED
Status: DISCONTINUED | OUTPATIENT
Start: 2025-02-14 | End: 2025-02-14

## 2025-02-14 RX ORDER — HYDROMORPHONE HCL/PF 1 MG/ML
0.5 SYRINGE (ML) INJECTION EVERY 4 HOURS PRN
Refills: 0 | Status: DISCONTINUED | OUTPATIENT
Start: 2025-02-14 | End: 2025-02-15 | Stop reason: HOSPADM

## 2025-02-14 RX ADMIN — HYDROMORPHONE HYDROCHLORIDE 0.5 MG: 1 INJECTION, SOLUTION INTRAMUSCULAR; INTRAVENOUS; SUBCUTANEOUS at 07:17

## 2025-02-14 RX ADMIN — MORPHINE SULFATE 2 MG: 2 INJECTION, SOLUTION INTRAMUSCULAR; INTRAVENOUS at 06:59

## 2025-02-14 RX ADMIN — ACETAMINOPHEN 650 MG: 325 TABLET, FILM COATED ORAL at 22:10

## 2025-02-14 RX ADMIN — PROPOFOL 50 MG: 10 INJECTION, EMULSION INTRAVENOUS at 13:54

## 2025-02-14 RX ADMIN — SODIUM CHLORIDE, SODIUM LACTATE, POTASSIUM CHLORIDE, AND CALCIUM CHLORIDE 125 ML/HR: .6; .31; .03; .02 INJECTION, SOLUTION INTRAVENOUS at 10:49

## 2025-02-14 RX ADMIN — DEXAMETHASONE SODIUM PHOSPHATE 10 MG: 10 INJECTION INTRAMUSCULAR; INTRAVENOUS at 14:24

## 2025-02-14 RX ADMIN — CEFAZOLIN SODIUM 2000 MG: 2 SOLUTION INTRAVENOUS at 10:49

## 2025-02-14 RX ADMIN — ONDANSETRON 4 MG: 2 INJECTION INTRAMUSCULAR; INTRAVENOUS at 14:37

## 2025-02-14 RX ADMIN — FENTANYL CITRATE 50 MCG: 50 INJECTION INTRAMUSCULAR; INTRAVENOUS at 14:29

## 2025-02-14 RX ADMIN — HYDROMORPHONE HYDROCHLORIDE 0.5 MG: 1 INJECTION, SOLUTION INTRAMUSCULAR; INTRAVENOUS; SUBCUTANEOUS at 07:10

## 2025-02-14 RX ADMIN — ALBUMIN (HUMAN): 12.5 INJECTION, SOLUTION INTRAVENOUS at 14:16

## 2025-02-14 RX ADMIN — SUGAMMADEX 200 MG: 100 INJECTION, SOLUTION INTRAVENOUS at 14:50

## 2025-02-14 RX ADMIN — PHENYLEPHRINE HYDROCHLORIDE 200 MCG: 10 INJECTION INTRAVENOUS at 13:57

## 2025-02-14 RX ADMIN — METRONIDAZOLE 500 MG: 500 INJECTION, SOLUTION INTRAVENOUS at 11:55

## 2025-02-14 RX ADMIN — PHENYLEPHRINE HYDROCHLORIDE 200 MCG: 10 INJECTION INTRAVENOUS at 14:10

## 2025-02-14 RX ADMIN — METRONIDAZOLE: 500 INJECTION, SOLUTION INTRAVENOUS at 14:08

## 2025-02-14 RX ADMIN — CEFAZOLIN SODIUM 2000 MG: 2 SOLUTION INTRAVENOUS at 14:02

## 2025-02-14 RX ADMIN — LIDOCAINE HYDROCHLORIDE 50 MG: 10 INJECTION, SOLUTION EPIDURAL; INFILTRATION; INTRACAUDAL at 13:49

## 2025-02-14 RX ADMIN — ROCURONIUM 50 MG: 50 INJECTION, SOLUTION INTRAVENOUS at 13:49

## 2025-02-14 RX ADMIN — HYDROMORPHONE HYDROCHLORIDE 0.5 MG: 1 INJECTION, SOLUTION INTRAMUSCULAR; INTRAVENOUS; SUBCUTANEOUS at 07:29

## 2025-02-14 RX ADMIN — POTASSIUM CHLORIDE 40 MEQ: 1500 TABLET, EXTENDED RELEASE ORAL at 07:33

## 2025-02-14 RX ADMIN — PROPOFOL 100 MG: 10 INJECTION, EMULSION INTRAVENOUS at 13:49

## 2025-02-14 RX ADMIN — HYDROMORPHONE HYDROCHLORIDE 0.2 MG: 0.2 INJECTION, SOLUTION INTRAMUSCULAR; INTRAVENOUS; SUBCUTANEOUS at 12:38

## 2025-02-14 RX ADMIN — ACETAMINOPHEN 1000 MG: 10 INJECTION INTRAVENOUS at 09:14

## 2025-02-14 RX ADMIN — ONDANSETRON 4 MG: 2 INJECTION INTRAMUSCULAR; INTRAVENOUS at 06:59

## 2025-02-14 RX ADMIN — HYDROMORPHONE HYDROCHLORIDE 0.5 MG: 1 INJECTION, SOLUTION INTRAMUSCULAR; INTRAVENOUS; SUBCUTANEOUS at 08:56

## 2025-02-14 RX ADMIN — HEPARIN SODIUM 5000 UNITS: 5000 INJECTION INTRAVENOUS; SUBCUTANEOUS at 22:10

## 2025-02-14 RX ADMIN — ROCURONIUM 20 MG: 50 INJECTION, SOLUTION INTRAVENOUS at 14:23

## 2025-02-14 RX ADMIN — NOREPINEPHRINE BITARTRATE 24 MCG: 1 INJECTION, SOLUTION, CONCENTRATE INTRAVENOUS at 14:01

## 2025-02-14 RX ADMIN — PHENYLEPHRINE HYDROCHLORIDE 100 MCG: 10 INJECTION INTRAVENOUS at 14:36

## 2025-02-14 RX ADMIN — SODIUM CHLORIDE, SODIUM LACTATE, POTASSIUM CHLORIDE, AND CALCIUM CHLORIDE: .6; .31; .03; .02 INJECTION, SOLUTION INTRAVENOUS at 14:59

## 2025-02-14 RX ADMIN — MIDAZOLAM 2 MG: 1 INJECTION INTRAMUSCULAR; INTRAVENOUS at 13:42

## 2025-02-14 RX ADMIN — FENTANYL CITRATE 50 MCG: 50 INJECTION INTRAMUSCULAR; INTRAVENOUS at 13:54

## 2025-02-14 RX ADMIN — PHENYLEPHRINE HYDROCHLORIDE 200 MCG: 10 INJECTION INTRAVENOUS at 14:01

## 2025-02-14 RX ADMIN — FENTANYL CITRATE 50 MCG: 50 INJECTION INTRAMUSCULAR; INTRAVENOUS at 13:49

## 2025-02-14 NOTE — ANESTHESIA POSTPROCEDURE EVALUATION
Post-Op Assessment Note    CV Status:  Stable  Pain Score: 0    Pain management: adequate       Mental Status:  Awake and sleepy   Hydration Status:  Euvolemic   PONV Controlled:  Controlled   Airway Patency:  Patent     Post Op Vitals Reviewed: Yes    No anethesia notable event occurred.    Staff: CRNA           Last Filed PACU Vitals:  Vitals Value Taken Time   Temp 98.1 °F (36.7 °C) 02/14/25 1518   Pulse 89 02/14/25 1523   /83 02/14/25 1519   Resp 11 02/14/25 1523   SpO2 98 % 02/14/25 1523   Vitals shown include unfiled device data.    Modified Sarah:     Vitals Value Taken Time   Activity 2 02/14/25 1518   Respiration 2 02/14/25 1518   Circulation 2 02/14/25 1518   Consciousness 1 02/14/25 1518   Oxygen Saturation 1 02/14/25 1518     Modified Sarah Score: 8

## 2025-02-14 NOTE — ANESTHESIA POSTPROCEDURE EVALUATION
Post-Op Assessment Note    CV Status:  Stable    Pain management: adequate       Mental Status:  Alert and awake   Hydration Status:  Euvolemic   PONV Controlled:  Controlled   Airway Patency:  Patent     Post Op Vitals Reviewed: Yes    No anethesia notable event occurred.    Staff: CRNA, Anesthesiologist           Last Filed PACU Vitals:  Vitals Value Taken Time   Temp 98 °F (36.7 °C) 02/14/25 1548   Pulse 90 02/14/25 1611   /65 02/14/25 1600   Resp 31 02/14/25 1611   SpO2 97 % 02/14/25 1611   Vitals shown include unfiled device data.    Modified Sarah:     Vitals Value Taken Time   Activity 2 02/14/25 1530   Respiration 2 02/14/25 1530   Circulation 2 02/14/25 1530   Consciousness 2 02/14/25 1530   Oxygen Saturation 2 02/14/25 1530     Modified Sarah Score: 10

## 2025-02-14 NOTE — ANESTHESIA PREPROCEDURE EVALUATION
Procedure:  EXAM UNDER ANESTHESIA (EUA) (Anus)  SIGMOIDOSCOPY FLEXIBLE (Abdomen)    39 y.o. male with a past medical history of rectal prolapse who presents with moderately sized rectal prolapse     No EKG or cardiac studies available    Relevant Problems   No relevant active problems      Meds:  Keflex not taking  Prednisone not taking    METS:  >4    NPO?:    Physical Exam    Airway    Mallampati score: I         Dental        Cardiovascular  Cardiovascular exam normal    Pulmonary  Pulmonary exam normal     Other Findings        Anesthesia Plan  ASA Score- 2     Anesthesia Type- general with ASA Monitors.         Additional Monitors:     Airway Plan: ETT and LMA.           Plan Factors-Exercise tolerance (METS): >4 METS.    Chart reviewed.   Existing labs reviewed. Patient summary reviewed.                  Induction- intravenous.    Postoperative Plan- Plan for postoperative opioid use. Planned trial extubation    Perioperative Resuscitation Plan - Level 1 - Full Code.       Informed Consent- Anesthetic plan and risks discussed with patient.  I personally reviewed this patient with the CRNA. Discussed and agreed on the Anesthesia Plan with the CRNA..      NPO Status:  No vitals data found for the desired time range.

## 2025-02-14 NOTE — ED PROVIDER NOTES
"Time reflects when diagnosis was documented in both MDM as applicable and the Disposition within this note       Time User Action Codes Description Comment    2/14/2025  7:09 AM Damion Kruger Add [K62.3] Rectal prolapse     2/14/2025  7:36 AM Damion Kruger Add [E87.6] Hypokalemia           ED Disposition       ED Disposition   Transfer to Another Facility-In Network    Condition   --    Date/Time   Fri Feb 14, 2025  7:09 AM    Comment   Harvey Bustos should be transferred out to [facility] ED Dr. Damon.               Assessment & Plan       Medical Decision Making  This is a 39-year-old male patient with a history of rectal prolapse since was a child normally reduced by him.  Unfortunately today after a bowel movement he had the biggest prolapse he is ever had and cannot reduce it is not severe pain.  Differential diagnose includes not limited to rectal prolapse, reducible versus nonreducible.    Problems Addressed:  Hypokalemia: acute illness or injury     Details: K-Dur was administered 40 mEq  Rectal prolapse: acute illness or injury     Details: Reduction was attempted with 10 pounds of sugar significant edema did reduce but I was unable to reduce the prolapse.  I then placed sugar over the prolapse wrapped in a sterile towel and transferred him to [place] for definitive treatment with colorectal surgery.  The prolapse remained pink with some capillary bleeding there was no sign of ischemia at this time    Amount and/or Complexity of Data Reviewed  Labs: ordered.     Details: All labs reviewed no acute finding  Discussion of management or test interpretation with external provider(s): Using joint decision-making with the patient Dr. Herber Sellers at [facility] patient will be transferred down priority 1 for further reduction of the prolapse and possible fixation    Portions of the record may have been created with voice recognition software. Occasional wrong word or \"sound a like\" substitutions " may have occurred due to the inherent limitations of voice recognition software. Read the chart carefully and recognize, using context, where substitutions have occurred.       .    Risk  Prescription drug management.        ED Course as of 02/14/25 1200 Fri Feb 14, 2025 0703 I contacted general surgeon at this campus immediately upon examining the patient.  Due to his severe rectal prolapse he advised me to contact colorectal which I did.  I did contact Dr. Young.  Agrees with sugar to reduce edema and chance of strangulation and transferred to [facility] for immediate intervention.  I did contact PACs at the onset of his evolution       Medications   morphine injection 2 mg (2 mg Intravenous Given 2/14/25 0659)   ondansetron (ZOFRAN) injection 4 mg (4 mg Intravenous Given 2/14/25 0659)   HYDROmorphone (DILAUDID) injection 0.5 mg (0.5 mg Intravenous Given 2/14/25 0710)   HYDROmorphone (DILAUDID) injection 0.5 mg (0.5 mg Intravenous Given 2/14/25 0717)   HYDROmorphone (DILAUDID) injection 0.5 mg (0.5 mg Intravenous Given 2/14/25 0729)   potassium chloride (Klor-Con M20) CR tablet 40 mEq (40 mEq Oral Given 2/14/25 0733)       ED Risk Strat Scores                                              History of Present Illness       Chief Complaint   Patient presents with    Rectal Pain     Pt states hx of flare up but pain worsened this morning while trying to use the bathroom, states it is worse than usual        Past Medical History:   Diagnosis Date    Rectal prolapse       Past Surgical History:   Procedure Laterality Date    CYST REMOVAL      HERNIA REPAIR        History reviewed. No pertinent family history.   Social History     Tobacco Use    Smoking status: Never    Smokeless tobacco: Never   Vaping Use    Vaping status: Never Used   Substance Use Topics    Alcohol use: No    Drug use: No      E-Cigarette/Vaping    E-Cigarette Use Never User       E-Cigarette/Vaping Substances    Nicotine No     THC  No     CBD No     Flavoring No     Other No     Unknown No       I have reviewed and agree with the history as documented.     This is a 39-year-old male patient who has had rectal prolapse since he was a child and normally can normally reduce them on his own.  This morning while having a bowel movement about 5 AM 2 hours ago he developed the largest prolapse he is ever had.  He comes in with rectal pain and a large rectal prolapse.  His wife placed him in a adult diaper because it does bleed a bit.  He is not hemorrhaging.  I immediately contacted general surgery at this [place] who stated call colorectal.  I then contacted colorectal at [place] and due to the size of the prolapse felt he would be better served with colorectal surgery.  I did attempt reduction using approximately 10 pounds of sugar I was able to reduce the amount of edema but it was so large I was unable to reduce the prolapse completely.  He will be sent via [facility] 1 transfer to [place] I did leave sugar in place to continue osmotic diuresis and strength down the edema.  Hoping that they can reduce it.  And also hoping to decrease chances of ischemia.  He has no other complaints.  He took several doses of pain medication to make him feel better.  He has not had any fever chills headache blurred vision no vision [place] sore throat no chest pain or shortness of breath or nausea vomiting diarrhea or abdominal pain.        Review of Systems        Objective       ED Triage Vitals   Temperature Pulse Blood Pressure Respirations SpO2 Patient Position - Orthostatic VS   02/14/25 0639 02/14/25 0639 02/14/25 0706 02/14/25 0639 02/14/25 0639 02/14/25 0639   97.7 °F (36.5 °C) 64 146/70 17 100 % Sitting      Temp Source Heart Rate Source BP Location FiO2 (%) Pain Score    02/14/25 0639 02/14/25 0639 02/14/25 0639 -- 02/14/25 0659    Oral Monitor Right arm  10 - Worst Possible Pain      Vitals      Date and Time Temp Pulse SpO2 Resp BP Pain Score FACES  Pain Rating User   02/14/25 0717 -- -- -- -- -- 10 - Worst Possible Pain -- TE   02/14/25 0706 -- -- -- -- 146/70 -- -- JK   02/14/25 0659 -- -- -- -- -- 10 - Worst Possible Pain -- JK   02/14/25 0639 97.7 °F (36.5 °C) 64 100 % 17 -- -- -- KL            Physical Exam  Vitals and nursing note reviewed.   Constitutional:       General: He is in acute distress.      Appearance: Normal appearance. He is well-developed. He is not ill-appearing, toxic-appearing or diaphoretic.   HENT:      Head: Normocephalic and atraumatic.      Right Ear: Tympanic membrane, ear canal and external ear normal.      Left Ear: Tympanic membrane, ear canal and external ear normal.      Nose: Nose normal.      Mouth/Throat:      Mouth: Mucous membranes are moist.      Pharynx: Oropharynx is clear. No oropharyngeal exudate or posterior oropharyngeal erythema.   Eyes:      General: No scleral icterus.        Right eye: No discharge.         Left eye: No discharge.      Conjunctiva/sclera: Conjunctivae normal.      Pupils: Pupils are equal, round, and reactive to light.   Cardiovascular:      Rate and Rhythm: Normal rate and regular rhythm.   Pulmonary:      Effort: Pulmonary effort is normal.      Breath sounds: Normal breath sounds.   Abdominal:      General: Bowel sounds are normal.      Palpations: Abdomen is soft.      Tenderness: There is no abdominal tenderness.   Genitourinary:     Comments: Patient has large rectal prolapse please see image on chart  Musculoskeletal:         General: Normal range of motion.      Cervical back: Normal range of motion and neck supple.      Right lower leg: No edema.      Left lower leg: No edema.   Skin:     General: Skin is warm.      Capillary Refill: Capillary refill takes less than 2 seconds.   Neurological:      General: No focal deficit present.      Mental Status: He is alert and oriented to person, place, and time. Mental status is at baseline.   Psychiatric:         Mood and Affect: Mood normal.          Behavior: Behavior normal.         Results Reviewed       Procedure Component Value Units Date/Time    RBC Morphology Reflex Test [[id number]] Collected: 02/14/25 0657    Lab Status: Final result Specimen: Blood from Arm, Right Updated: 02/14/25 0802    CBC and differential [[id number]]  (Normal) Collected: 02/14/25 0657    Lab Status: Final result Specimen: Blood from Arm, Right Updated: 02/14/25 0751     WBC 8.49 Thousand/uL      RBC 4.83 Million/uL      Hemoglobin 14.2 g/dL      Hematocrit 39.8 %      MCV 82 fL      MCH 29.4 pg      MCHC 35.7 g/dL      RDW 13.5 %      MPV 11.4 fL      Platelets 157 Thousands/uL     Narrative:      This is an appended report.  These results have been appended to a previously verified report.    Manual Differential(PHLEBS Do Not Order) [[id number]]  (Abnormal) Collected: 02/14/25 0657    Lab Status: Final result Specimen: Blood from Arm, Right Updated: 02/14/25 0751     Segmented % 55 %      Lymphocytes % 37 %      Monocytes % 5 %      Eosinophils % 2 %      Basophils % 0 %      Atypical Lymphocytes % 1 %      Absolute Neutrophils 4.67 Thousand/uL      Absolute Lymphocytes 3.23 Thousand/uL      Absolute Monocytes 0.42 Thousand/uL      Absolute Eosinophils 0.17 Thousand/uL      Absolute Basophils 0.00 Thousand/uL      Total Counted --     RBC Morphology Normal     Platelet Estimate Adequate    Basic metabolic panel [[id number]]  (Abnormal) Collected: 02/14/25 0657    Lab Status: Final result Specimen: Blood from Arm, Right Updated: 02/14/25 0716     Sodium 142 mmol/L      Potassium 3.2 mmol/L      Chloride 108 mmol/L      CO2 28 mmol/L      ANION GAP 6 mmol/L      BUN 18 mg/dL      Creatinine 1.16 mg/dL      Glucose 103 mg/dL      Calcium 8.7 mg/dL      eGFR 80 ml/min/1.73sq m     Narrative:      National Kidney Disease Foundation guidelines for Chronic Kidney Disease (CKD):     Stage 1 with normal or high GFR (GFR > 90 mL/min/1.73 square meters)    Stage 2 Mild CKD (GFR = 60-89  mL/min/1.73 square meters)    Stage 3A Moderate CKD (GFR = 45-59 mL/min/1.73 square meters)    Stage 3B Moderate CKD (GFR = 30-44 mL/min/1.73 square meters)    Stage 4 Severe CKD (GFR = 15-29 mL/min/1.73 square meters)    Stage 5 End Stage CKD (GFR <15 mL/min/1.73 square meters)  Note: GFR calculation is accurate only with a steady state creatinine            No orders to display       Procedures    ED Medication and Procedure Management   Prior to Admission Medications   Prescriptions Last Dose Informant Patient Reported? Taking?   cephalexin (KEFLEX) 500 mg capsule   No No   Sig: Take 1 capsule by mouth 4 (four) times a day   predniSONE 20 mg tablet   No No   Sig: Take 2 tablets (40 mg total) by mouth daily      Facility-Administered Medications: None     Discharge Medication List as of 2/14/2025  7:45 AM        CONTINUE these medications which have NOT CHANGED    Details   cephalexin (KEFLEX) 500 mg capsule Take 1 capsule by mouth 4 (four) times a day, Starting 11/1/2016, Until Discontinued, Print      predniSONE 20 mg tablet Take 2 tablets (40 mg total) by mouth daily, Starting Tue 1/10/2023, Print           No discharge procedures on file.  ED SEPSIS DOCUMENTATION   Time reflects when diagnosis was documented in both MDM as applicable and the Disposition within this note       Time User Action Codes Description Comment    2/14/2025  7:09 AM Damion Zabala [K62.3] Rectal prolapse     2/14/2025  7:36 AM Damion Zabala [E87.6] Hypokalemia                  Damion Zabala PA-C  02/14/25 1200

## 2025-02-14 NOTE — EMTALA/ACUTE CARE TRANSFER
Formerly [facility], later [facility] EMERGENCY DEPARTMENT  [address]  [place] [address]  Dept: [phone]      EMTALA TRANSFER CONSENT    NAME Harvey DURAN 1985                              MRN [id number]    I have been informed of my rights regarding examination, treatment, and transfer   by Dr. Starr Baer*    Benefits: Specialized equipment and/or services available at the receiving facility (Include comment)________________________    Risks: Potential for delay in receiving treatment, Potential deterioration of medical condition, Loss of IV, Increased discomfort during transfer, Possible worsening of condition or death during transfer      Consent for Transfer:  I acknowledge that my medical condition has been evaluated and explained to me by the emergency department physician or other qualified medical person and/or my attending physician, who has recommended that I be transferred to the service of  Accepting Physician: Hector at Accepting Facility Name, City & State : [facility]. The above potential benefits of such transfer, the potential risks associated with such transfer, and the probable risks of not being transferred have been explained to me, and I fully understand them.  The doctor has explained that, in my case, the benefits of transfer outweigh the risks.  I agree to be transferred.    I authorize the performance of emergency medical procedures and treatments upon me in both transit and upon arrival at the receiving facility.  Additionally, I authorize the release of any and all medical records to the receiving facility and request they be transported with me, if possible.  I understand that the safest mode of transportation during a medical emergency is an ambulance and that the Hospital advocates the use of this mode of transport. Risks of traveling to the receiving facility by car, including absence of medical control, life  sustaining equipment, such as oxygen, and medical personnel has been explained to me and I fully understand them.    (MAL CORRECT BOX BELOW)  [  ]  I consent to the stated transfer and to be transported by ambulance/helicopter.  [  ]  I consent to the stated transfer, but refuse transportation by ambulance and accept full responsibility for my transportation by car.  I understand the risks of non-ambulance transfers and I exonerate the Hospital and its staff from any deterioration in my condition that results from this refusal.    X___________________________________________    DATE  25  TIME________  Signature of patient or legally responsible individual signing on patient behalf           RELATIONSHIP TO PATIENT_________________________          Provider Certification    NAME Harvey Bustos                                        [facility] 1985                              MRN [id number]    A medical screening exam was performed on the above named patient.  Based on the examination:    Condition Necessitating Transfer The encounter diagnosis was Rectal prolapse.    Patient Condition: The patient has been stabilized such that within reasonable medical probability, no material deterioration of the patient condition or the condition of the unborn child(ashley) is likely to result from the transfer    Reason for Transfer: Level of Care needed not available at this facility    Transfer Requirements: Facility [facility]   Space available and qualified personnel available for treatment as acknowledged by PACS  Agreed to accept transfer and to provide appropriate medical treatment as acknowledged by       Hector  Appropriate medical records of the examination and treatment of the patient are provided at the time of transfer   STAFF INITIAL WHEN COMPLETED _______  Transfer will be performed by qualified personnel from [facility]  and appropriate transfer equipment as required, including the use of necessary and appropriate life  support measures.    Provider Certification: I have examined the patient and explained the following risks and benefits of being transferred/refusing transfer to the patient/family:  General risk, such as traffic hazards, adverse weather conditions, rough terrain or turbulence, possible failure of equipment (including vehicle or aircraft), or consequences of actions of persons outside the control of the transport personnel, Unanticipated needs of medical equipment and personnel during transport, Risk of worsening condition, The possibility of a transport vehicle being unavailable      Based on these reasonable risks and benefits to the patient and/or the unborn child(ashley), and based upon the information available at the time of the patient’s examination, I certify that the medical benefits reasonably to be expected from the provision of appropriate medical treatments at another medical facility outweigh the increasing risks, if any, to the individual’s medical condition, and in the case of labor to the unborn child, from effecting the transfer.    X____________________________________________ DATE 02/14/25        TIME_______      ORIGINAL - SEND TO MEDICAL RECORDS   COPY - SEND WITH PATIENT DURING TRANSFER

## 2025-02-14 NOTE — H&P
H&P - Colorectal   Name: Harvey Bustos 39 y.o. male I MRN: [id number]  Unit/Bed#: ED 03 I Date of Admission: 2/14/2025   Date of Service: 2/14/2025 I Hospital Day: 0     Assessment & Plan  Rectal prolapse  -Plan for EUA reduction of prolapse, flex sig, possible Altemeier procedure in the operating room  -Admit under Dr. Young  -NPO  -IV fluids at 125  -On call abx to OR, Ancef/Flagyl  -Discussed surgical intervention with patient and wife at bedside, explained risks and benefits, they are in agreement with plan to proceed to the operating room.  All questions answered.    History of Present Illness   Harvey Bustos is a 39 y.o. male with a past medical history of rectal prolapse who presents with moderately sized rectal prolapse.  Patient states he has a chronic history of rectal prolapse since he was a child for which he manually reduces himself at home.  He states he felt the prolapse 2 days ago and kept reducing himself at home.  This morning when he awoke he noticed significant swelling of the area and was unable to reduce his prolapse.  He presented to the [facility] and was transferred to [facility] for colorectal evaluation.  He notes prior to this prolapse he had a diarrhea bowel movement.  He states he usually tries to increase the fiber in his diet to prevent further prolapse.    No previous colonoscopy. History of hernia repair approximately 9 years ago, unable to distinguish what type of hernia.     Review of Systems   Constitutional:  Negative for activity change, appetite change, chills, fatigue and fever.   HENT:  Negative for congestion, rhinorrhea, sinus pressure, sinus pain, sneezing and sore throat.    Eyes:  Negative for pain, discharge and itching.   Respiratory:  Negative for cough, chest tightness, shortness of breath and wheezing.    Cardiovascular:  Negative for chest pain and palpitations.   Gastrointestinal:  Positive for anal bleeding (from prolapsed rectum), diarrhea and rectal  pain. Negative for abdominal distention, abdominal pain, blood in stool, constipation, nausea and vomiting.   Genitourinary:  Negative for decreased urine volume, difficulty urinating, dysuria, flank pain, hematuria and urgency.   Musculoskeletal:  Negative for back pain, myalgias and neck pain.   Skin:  Negative for color change and rash.   Neurological:  Negative for dizziness, weakness, light-headedness and headaches.   Psychiatric/Behavioral:  Negative for agitation, behavioral problems, confusion and decreased concentration.      Historical Information   Past Medical History:   Diagnosis Date    Rectal prolapse      Past Surgical History:   Procedure Laterality Date    CYST REMOVAL      HERNIA REPAIR       Social History     Tobacco Use    Smoking status: Never    Smokeless tobacco: Never   Vaping Use    Vaping status: Never Used   Substance and Sexual Activity    Alcohol use: No    Drug use: No    Sexual activity: Not on file     E-Cigarette/Vaping    E-Cigarette Use Never User      E-Cigarette/Vaping Substances    Nicotine No     THC No     CBD No     Flavoring No     Other No     Unknown No      Social History     Tobacco Use    Smoking status: Never    Smokeless tobacco: Never   Vaping Use    Vaping status: Never Used   Substance and Sexual Activity    Alcohol use: No    Drug use: No    Sexual activity: Not on file       Current Facility-Administered Medications:     acetaminophen (TYLENOL) tablet 650 mg, Q6H FERNANDO    heparin (porcine) subcutaneous injection 5,000 Units, Q8H FERNANDO **AND** Platelet count, Once    HYDROmorphone (DILAUDID) injection 0.5 mg, Q4H PRN    lactated ringers infusion, Continuous    oxyCODONE (ROXICODONE) immediate release tablet 10 mg, Q4H PRN    oxyCODONE (ROXICODONE) IR tablet 5 mg, Q4H PRN  Prior to Admission Medications   Prescriptions Last Dose Informant Patient Reported? Taking?   cephalexin (KEFLEX) 500 mg capsule   No No   Sig: Take 1 capsule by mouth 4 (four) times a day    predniSONE 20 mg tablet   No No   Sig: Take 2 tablets (40 mg total) by mouth daily      Facility-Administered Medications: None     Patient has no known allergies.    Objective :  Temp:  [97.7 °F (36.5 °C)-98.1 °F (36.7 °C)] 98.1 °F (36.7 °C)  HR:  [51-64] 51  BP: (126-146)/(61-83) 126/61  Resp:  [17-20] 20  SpO2:  [94 %-100 %] 99 %  O2 Device: None (Room air)      Physical Exam   General: Pt is AAOx3, lying down in bed in prone position in mild acute distress secondary to discomfort/.   HEENT:  normocephalic, moist mucous membranes   Neck: Supple, non-tender, ROM intact   CV: Intermittently bradycardic.   Resp: normal respiratory effort no wheezes, rhonchi, rhales   Abd: Soft, with no tenderness, non-distended, non-tympanitic. Normoactive bowelsounds all 4 quadrants. No rebound or guarding.    Buttock: Significantly edematous and congested prolapsed rectum. Sugar applied to assist with reduction. Unable to reduce at bedside.      Ext: Warm with no cyanosis, no edema, no deformities. ROM intact   Skin: No rashes, bruises, ulcers.   Neuro: Sensation intact all 4 extremities. 5+ strength all 4 extremities.       Lab Results: I have reviewed the following results:  Recent Labs     02/14/25  0657   WBC 8.49   HGB 14.2   HCT 39.8      SODIUM 142   K 3.2*      CO2 28   BUN 18   CREATININE 1.16   GLUC 103       Imaging Results Review: No pertinent imaging studies reviewed.  Other Study Results Review: No additional pertinent studies reviewed.    VTE Pharmacologic Prophylaxis: Heparin  VTE Mechanical Prophylaxis: sequential compression device    Isi Wiley

## 2025-02-14 NOTE — ASSESSMENT & PLAN NOTE
-Plan for EUA reduction of prolapse, flex sig, possible Altemeier procedure in the operating room  -Admit under Dr. Young  -NPO  -IV fluids at 125  -On call abx to OR, Ancef/Flagyl  -Discussed surgical intervention with patient and wife at bedside, explained risks and benefits, they are in agreement with plan to proceed to the operating room.  All questions answered.

## 2025-02-15 VITALS
OXYGEN SATURATION: 98 % | SYSTOLIC BLOOD PRESSURE: 116 MMHG | BODY MASS INDEX: 27.09 KG/M2 | DIASTOLIC BLOOD PRESSURE: 65 MMHG | HEART RATE: 66 BPM | TEMPERATURE: 99.1 F | HEIGHT: 67 IN | RESPIRATION RATE: 17 BRPM | WEIGHT: 172.62 LBS

## 2025-02-15 LAB
ANION GAP SERPL CALCULATED.3IONS-SCNC: 6 MMOL/L (ref 4–13)
BUN SERPL-MCNC: 14 MG/DL (ref 5–25)
CALCIUM SERPL-MCNC: 9 MG/DL (ref 8.4–10.2)
CHLORIDE SERPL-SCNC: 105 MMOL/L (ref 96–108)
CO2 SERPL-SCNC: 28 MMOL/L (ref 21–32)
CREAT SERPL-MCNC: 1.14 MG/DL (ref 0.6–1.3)
ERYTHROCYTE [DISTWIDTH] IN BLOOD BY AUTOMATED COUNT: 13.6 % (ref 11.6–15.1)
GFR SERPL CREATININE-BSD FRML MDRD: 81 ML/MIN/1.73SQ M
GLUCOSE P FAST SERPL-MCNC: 97 MG/DL (ref 65–99)
GLUCOSE SERPL-MCNC: 97 MG/DL (ref 65–140)
HCT VFR BLD AUTO: 37.1 % (ref 36.5–49.3)
HGB BLD-MCNC: 13.6 G/DL (ref 12–17)
MAGNESIUM SERPL-MCNC: 1.8 MG/DL (ref 1.9–2.7)
MCH RBC QN AUTO: 30.1 PG (ref 26.8–34.3)
MCHC RBC AUTO-ENTMCNC: 36.7 G/DL (ref 31.4–37.4)
MCV RBC AUTO: 82 FL (ref 82–98)
PHOSPHATE SERPL-MCNC: 3.6 MG/DL (ref 2.7–4.5)
PLATELET # BLD AUTO: 143 THOUSANDS/UL (ref 149–390)
PMV BLD AUTO: 11.6 FL (ref 8.9–12.7)
POTASSIUM SERPL-SCNC: 3.9 MMOL/L (ref 3.5–5.3)
RBC # BLD AUTO: 4.52 MILLION/UL (ref 3.88–5.62)
SODIUM SERPL-SCNC: 139 MMOL/L (ref 135–147)
WBC # BLD AUTO: 15.8 THOUSAND/UL (ref 4.31–10.16)

## 2025-02-15 PROCEDURE — 85027 COMPLETE CBC AUTOMATED: CPT

## 2025-02-15 PROCEDURE — 99024 POSTOP FOLLOW-UP VISIT: CPT | Performed by: SURGERY

## 2025-02-15 PROCEDURE — 80048 BASIC METABOLIC PNL TOTAL CA: CPT

## 2025-02-15 PROCEDURE — 84100 ASSAY OF PHOSPHORUS: CPT

## 2025-02-15 PROCEDURE — 83735 ASSAY OF MAGNESIUM: CPT

## 2025-02-15 PROCEDURE — 96366 THER/PROPH/DIAG IV INF ADDON: CPT

## 2025-02-15 RX ADMIN — HEPARIN SODIUM 5000 UNITS: 5000 INJECTION INTRAVENOUS; SUBCUTANEOUS at 08:15

## 2025-02-15 RX ADMIN — OXYCODONE HYDROCHLORIDE 5 MG: 5 TABLET ORAL at 08:22

## 2025-02-15 RX ADMIN — ACETAMINOPHEN 650 MG: 325 TABLET, FILM COATED ORAL at 08:23

## 2025-02-15 RX ADMIN — OXYCODONE HYDROCHLORIDE 10 MG: 10 TABLET ORAL at 12:36

## 2025-02-15 NOTE — ASSESSMENT & PLAN NOTE
-S/p EUA reduction of prolapse, flex sig.    Plan:   -Regular diet  - PRN pain and nausea control  - DVT ppx  - I/Os  - OOB, ambulate  - Dispo planning, outpatient follow-up

## 2025-02-15 NOTE — DISCHARGE INSTR - AVS FIRST PAGE
[facility] Surgical Discharge Instructions    Please call [facility] Colorectal Surgery ([phone]) to schedule your follow-up appointment with Dr. Young in 2 weeks.    Activity:  - No lifting, pushing, or pulling anything over 20 pounds until cleared by your physician  - Regular activity (working around the house, walking up/down stairs, etc.) is ok and encouraged    Diet:    - You may resume your normal diet    Medications:    - Continue your pre-hospital medication regimen after discharge unless you were instructed otherwise. Please refer to your discharge medication list for further details.  - For the first few days following your procedure, take Tylenol to provide a solid baseline pain control and use the stronger pain medications (if provided) for more severe pain    Additional Instructions:  - If you have any questions or concerns after discharge please do not hesitate to contact our office, we would be happy to provide clarification      Call our office or return to the Emergency Room if you develop a fever greater than 101F, chills, persistent nausea/vomiting, worsening/uncontrollable pain, and/or increasing redness or purulent/foul smelling drainage

## 2025-02-15 NOTE — PROGRESS NOTES
Progress Note - Surgery-General   Name: Harvey Bustos 39 y.o. male I MRN: [id number]  Unit/Bed#: [facility] 815-01 I Date of Admission: 2/14/2025   Date of Service: 2/14/2025 I Hospital Day: 0    Assessment & Plan  Rectal prolapse  -S/p EUA reduction of prolapse, flex sig.    Plan:   -Regular diet  - PRN pain and nausea control  - DVT ppx  - I/Os  - OOB, ambulate  - Dispo planning, outpatient follow-up    Please contact the SecureChat role for the Colorectal service with any questions/concerns.    Subjective   No acute events overnight. Patient reports some pressure in his rectum. They are tolerating their diet. They are having flatus but no BM. They are voiding. They are ambulating. They deny nausea, vomiting, chest pain, shortness of breath, fevers, chills.      Objective :  Temp:  [97.7 °F (36.5 °C)-98.6 °F (37 °C)] 98.6 °F (37 °C)  HR:  [50-81] 81  BP: (112-146)/(59-83) 112/74  Resp:  [12-20] 17  SpO2:  [94 %-100 %] 97 %  O2 Device: None (Room air)    I/O         02/13 0701  02/14 0700 02/14 0701  02/15 0700    I.V. (mL/kg)  1200 (15.3)    IV Piggyback  600    Total Intake(mL/kg)  1800 (23)    Urine (mL/kg/hr)  925    Total Output  925    Net  +875                  Physical Exam  Constitutional:       General: He is not in acute distress.  HENT:      Head: Normocephalic and atraumatic.      Right Ear: External ear normal.      Left Ear: External ear normal.      Nose: Nose normal.      Mouth/Throat:      Pharynx: Oropharynx is clear.   Eyes:      Extraocular Movements: Extraocular movements intact.   Cardiovascular:      Rate and Rhythm: Normal rate.   Pulmonary:      Effort: Pulmonary effort is normal.   Abdominal:      General: There is no distension.      Palpations: Abdomen is soft.      Tenderness: There is no abdominal tenderness.   Genitourinary:     Comments: No prolapse seen  Musculoskeletal:         General: No swelling.      Cervical back: Normal range of motion.   Skin:     General: Skin is warm and dry.    Neurological:      Mental Status: He is alert. Mental status is at baseline.   Psychiatric:         Mood and Affect: Mood normal.         Lab Results: I have reviewed the following results:  Recent Labs     02/14/25  0657   WBC 8.49   HGB 14.2   HCT 39.8      SODIUM 142   K 3.2*      CO2 28   BUN 18   CREATININE 1.16   GLUC 103       Imaging Results Review: No pertinent imaging studies reviewed.  Other Study Results Review: No additional pertinent studies reviewed.    VTE Pharmacologic Prophylaxis: VTE covered by:  heparin (porcine), Subcutaneous, 5,000 Units at 02/14/25 2210     VTE Mechanical Prophylaxis: sequential compression device

## 2025-02-15 NOTE — QUICK NOTE
Colorectal Surgery Post-Op Check  Harvey Bustos 39 y.o. male MRN: [id number]  Unit/Bed#: [facility] 815-01 Encounter: [id number]     S: Patient seen and evaluated at bedside after arrival to floor. Rectal prolapse recurred, was able to be reduced. Patient denies any pain. They are tolerating their diet. They are not yet having flatus or BM. They are voiding. They are not yet ambulating. They deny nausea, vomiting, chest pain, shortness of breath, fevers, chills.      O:   Vitals:    02/14/25 1656   BP: 112/74   Pulse: 81   Resp: 17   Temp: 98.6 °F (37 °C)   SpO2: 97%     I/O         02/13 0701 02/14 0700 02/14 0701  02/15 0700    I.V. (mL/kg)  1200 (15.3)    IV Piggyback  600    Total Intake(mL/kg)  1800 (23)    Urine (mL/kg/hr)  375    Total Output  375    Net  +1425                PE:  Gen:  No acute distress  CV:  Warm, well-perfused  Lung:  Normal work of breathing, no respiratory distress  Abd:  Soft, non tender, nondistended   Ext:  Moving all extremities  Neuro:  Alert and oriented, M/S grossly intact  Skin:  Incisions and dressings clean, dry, intact  :  No rectal prolapse noted on exam     Lab Results   Component Value Date    WBC 8.49 02/14/2025    HGB 14.2 02/14/2025    HCT 39.8 02/14/2025    MCV 82 02/14/2025     02/14/2025     Lab Results   Component Value Date    CALCIUM 8.7 02/14/2025    K 3.2 (L) 02/14/2025    CO2 28 02/14/2025     02/14/2025    BUN 18 02/14/2025    CREATININE 1.16 02/14/2025         A/P: 39 y.o. male * Day of Surgery * s/p Procedure(s) (LRB):  EXAM UNDER ANESTHESIA (EUA) REDUCTION OF RECTAL PROLAPSE (N/A)  SIGMOIDOSCOPY FLEXIBLE (N/A)    Plan:  Regular diet  DVT ppx  Out of bed, encourage ambulation  Encourage incentive spirometer use  Strict I's and O's  Pain and nausea control p.r.n.  Dispo planning      María Cash MD  PGYI, General Surgery

## 2025-02-17 ENCOUNTER — TELEPHONE (OUTPATIENT)
Age: 40
End: 2025-02-17

## 2025-02-17 NOTE — TELEPHONE ENCOUNTER
Isi Wiley PA-C: this patient needs a postoperative follow-up appointment. Had a prolapsed rectum and will need eventual definitive management with Dr. Young. She wants to scope him before any of this would happen. But he needs an office appointment in 2 to 3 weeks thank you.     Appt. 3/5/[address]

## 2025-03-04 NOTE — PROGRESS NOTES
Name: Harvey Bustos      : 1985      MRN: [id number]  Encounter Provider: Manjula Young MD  Encounter Date: 3/5/2025   Encounter department: [facility] COLON AND RECTAL SURGERY [place]  :  Assessment & Plan  Rectal prolapse  Patient is status post EUA and reduction of rectal prolapse on 2025  He has had no recurrences since he was discharged from the hospital  Since that time, he had been maintaining a high-fiber diet and minimizing straining on the toilet  We discussed the etiology of rectal prolapse  I did recommend colonoscopy followed by laparoscopic robotic assisted ventral mesh rectopexy  However, at this time, patient reports minimal symptoms and would like to hold off on surgery  At the very least, we will schedule colonoscopy to rule out pathologic lead point  He may otherwise follow-up as needed  Orders:    Case request operating room: RECTOPEXY LAPAROSCOPIC W/ ROBOTICS; Standing           History of Present Illness   HPI    Harvey Bustos is a 39 y.o. male who presents for a post operative evaluation.     The patient presented to the [facility] ED on 25 for rectal prolapse. He reported a chronic history of rectal prolapse since childhood, which he typically managed by manually reducing the prolapse at home. However, he was unable to reduce the prolapse earlier that morning. He was transferred to [facility] for colorectal evaluation.     Once admitted, he underwent exam under anesthesia, reduction of rectal prolapse, flexible sigmoidoscopy on 25.    Operative Findings:  Nonstrangulated full-thickness rectal prolapse, easily reduced following induction of anesthesia  Flexible sigmoidoscopy showing congested but viable mucosa with minimal areas of patchy ischemia    Eventual definitive repair with robotic ventral mesh rectopexy was discussed. He was discharged on 2/15/25 and advised to follow up with outpatient colorectal surgery.     Lab Results  "  Component Value Date    WBC 15.80 (H) 02/15/2025    HGB 13.6 02/15/2025    HCT 37.1 02/15/2025    MCV 82 02/15/2025     (L) 02/15/2025     Lab Results   Component Value Date    SODIUM 139 02/15/2025    K 3.9 02/15/2025     02/15/2025    CO2 28 02/15/2025    AGAP 6 02/15/2025    BUN 14 02/15/2025    CREATININE 1.14 02/15/2025    GLUC 97 02/15/2025    GLUF 97 02/15/2025    CALCIUM 9.0 02/15/2025    AST 13 09/18/2023    ALT <3 09/18/2023    ALKPHOS 78 09/18/2023    TP 6.9 09/18/2023    TBILI 1.5 (H) 09/18/2023    EGFR 81 02/15/2025     Review of Systems   Constitutional:  Negative for chills and fever.   HENT:  Negative for ear pain and sore throat.    Eyes:  Negative for pain and visual disturbance.   Respiratory:  Negative for cough and shortness of breath.    Cardiovascular:  Negative for chest pain and palpitations.   Gastrointestinal:  Negative for abdominal pain and vomiting.   Genitourinary:  Negative for dysuria and hematuria.   Musculoskeletal:  Negative for arthralgias and back pain.   Skin:  Negative for color change and rash.   Neurological:  Negative for seizures and syncope.   All other systems reviewed and are negative.      Objective   Ht 5' 7\" (1.702 m)   Wt 74.8 kg (165 lb)   BMI 25.84 kg/m²      Physical Exam  Vitals and nursing note reviewed.   Constitutional:       General: He is not in acute distress.     Appearance: He is well-developed.   HENT:      Head: Normocephalic and atraumatic.   Eyes:      Conjunctiva/sclera: Conjunctivae normal.   Cardiovascular:      Rate and Rhythm: Normal rate and regular rhythm.      Heart sounds: No murmur heard.  Pulmonary:      Effort: Pulmonary effort is normal. No respiratory distress.      Breath sounds: Normal breath sounds.   Abdominal:      Palpations: Abdomen is soft.      Tenderness: There is no abdominal tenderness.   Musculoskeletal:         General: No swelling.      Cervical back: Neck supple.   Skin:     General: Skin is warm and " dry.      Capillary Refill: Capillary refill takes less than 2 seconds.   Neurological:      Mental Status: He is alert.   Psychiatric:         Mood and Affect: Mood normal.       Administrative Statements   I have spent a total time of 16 minutes in caring for this patient on the day of the visit/encounter including Diagnostic results, Prognosis, Risks and benefits of tx options, Instructions for management, Patient and family education, Importance of tx compliance, Risk factor reductions, Impressions, Counseling / Coordination of care, Documenting in the medical record, Reviewing/placing orders in the medical record (including tests, medications, and/or procedures), Obtaining or reviewing history  , and Communicating with other healthcare professionals .

## 2025-03-05 ENCOUNTER — PREP FOR PROCEDURE (OUTPATIENT)
Age: 40
End: 2025-03-05

## 2025-03-05 ENCOUNTER — OFFICE VISIT (OUTPATIENT)
Age: 40
End: 2025-03-05

## 2025-03-05 ENCOUNTER — TELEPHONE (OUTPATIENT)
Age: 40
End: 2025-03-05

## 2025-03-05 VITALS — WEIGHT: 165 LBS | HEIGHT: 67 IN | BODY MASS INDEX: 25.9 KG/M2

## 2025-03-05 DIAGNOSIS — K62.3 RECTAL PROLAPSE: Primary | ICD-10-CM

## 2025-03-05 PROCEDURE — 99024 POSTOP FOLLOW-UP VISIT: CPT | Performed by: SURGERY

## 2025-03-05 RX ORDER — HEPARIN SODIUM 5000 [USP'U]/ML
5000 INJECTION, SOLUTION INTRAVENOUS; SUBCUTANEOUS ONCE
OUTPATIENT
Start: 2025-03-05 | End: 2025-03-05

## 2025-03-05 RX ORDER — LIDOCAINE HYDROCHLORIDE 10 MG/ML
0.5 INJECTION, SOLUTION EPIDURAL; INFILTRATION; INTRACAUDAL; PERINEURAL ONCE AS NEEDED
OUTPATIENT
Start: 2025-03-05

## 2025-03-05 NOTE — LETTER
Harvey Akash  [address] [place] [address]        Location:  [address] [address]    Performing Physician: Manjula Young MD    DATE OF PROCEDURE: 4/15/2025     The OR/GI Lab will contact you the evening prior to your procedure with your exact arrival time.    We kindly ask that you immediately notify us of any need to cancel or reschedule your procedure.      WEEK BEFORE THE PROCEDURE:  Do not take Iron tablets for one week  5 days prior to the appointment AVOID vegetables and fruits with skins or seeds, nuts, corn, popcorn, and whole grain breads.  Purchase: One (1) 238-gram container of Miralax (polyethylene glycol 3350), four (4) 5 mg Dulcolax (bisacodyl) tablets, and 64-ounces of Gatorade (sports drink) - no red, orange, or purple. These may be purchased at any pharmacy without a prescription. Generic products are permissible.  Arrange responsible transportation for day of the procedure.      DAY BEFORE THE PROCEDURE:  CLEAR liquids only for entire day prior. Nothing red, orange or purple.  You MAY have:  Soda  Water  Broth Gatorade  Jell-O  Popsicles Coffee/tea without  Milk/creamer     YOU MAY NOT HAVE:  Solid foods  Milk and milk products  Juice with pulp    BOWEL PREPARATION: Includes: One (1) 238-gram container of Miralax (polyethylene glycol 3350), four (4) 5 mg Dulcolax (bisacodyl) tablets, and 64-ounces of Gatorade (sports drink). Preparation may be refrigerated. Entire bowel prep should be completed.    Afternoon before the procedure (2:00 pm - 5:00 pm):  Take two (2) 5 mg Dulcolax laxative tablets.    Evening before the procedure (6:00 pm):  Mix entire container of Miralax with 64-ounces of Gatorade and shake until all medication is dissolved.  Begin drinking solution. Drink an eight (8) ounce cup every 10-15 minutes until you have consumed half (32 ounces) of the solution. Refrigerate remaining solution.    Night before the procedure (8:00 pm):  Take two  (2) 5 mg Dulcolax laxative tablets.    Beginning 5 hours before your procedure:  Drink the remaining amount of prepared solution (32 ounces). Drink an eight (8) ounce cup every 10-15 minutes until you have consumed the remaining solution.      Bowel prep should be completed 4 hours prior to procedure time.  NOTHING TO EAT OR DRINK AFTER MIDNIGHT -EXCEPT YOUR BOWEL PREP                                                                                                                                                                                DAY OF THE PROCEDURE:  You may brush your teeth.  Leave all jewelry at home. Take out any facial piercings, if able.  Please arrive for your procedure as indicated by the OR / GI Lab / Endoscopy Unit. The hospital will contact you the day before with your exact arrival time.  Make sure you have arranged ahead of time for a responsible adult (18 or older) to accompany and drive you home after the procedure. Please discuss any transportation concerns with our staff prior to your procedure.  The effects of the anesthesia can persist for 24 hours. After receiving the sedation, you must exercise caution before engaging in any activity that could harm yourself and others (such as driving a car). Do not make any important decisions or do not drink any alcoholic beverages during this time period.  After your procedure, you may have anything you’d like to eat or drink. You will probably want to start with something light. Please include plenty of fluids. Avoid items that cause gas such as sodas and salads.      SPECIAL INSTRUCTIONS:    Prescribed medications:  Do not stop your aspirin, or any of your other medications (unless instructed otherwise).  Take the rest of your prescribed medications with small sips of water at least 2 hours prior to your procedure.      NOTHING TO EAT OR DRINK (INCLUDING CHEWING GUM) AFTER 12 MIDNIGHT PRIOR TO YOUR PROCEDURE EXCEPT YOUR BOWEL PREP.      For any  questions or concerns related to your bowel preparation or pre-procedure instructions, please contact our office.  Thank you for choosing [facility]!      [phone]

## 2025-03-05 NOTE — LETTER
Harvey Akash  [address] [place] [address]        Location:  [address] [address]    Performing Physician: Manjula Young MD    DATE OF PROCEDURE: 4/15/2025     The OR/GI Lab will contact you the evening prior to your procedure with your exact arrival time.    We kindly ask that you immediately notify us of any need to cancel or reschedule your procedure.      WEEK BEFORE THE PROCEDURE:  Do not take Iron tablets for one week  5 days prior to the appointment AVOID vegetables and fruits with skins or seeds, nuts, corn, popcorn, and whole grain breads.  Purchase: One (1) 238-gram container of Miralax (polyethylene glycol 3350), four (4) 5 mg Dulcolax (bisacodyl) tablets, and 64-ounces of Gatorade (sports drink) - no red, orange, or purple. These may be purchased at any pharmacy without a prescription. Generic products are permissible.  Arrange responsible transportation for day of the procedure.      DAY BEFORE THE PROCEDURE:  CLEAR liquids only for entire day prior. Nothing red, orange or purple.  You MAY have:  Soda  Water  Broth Gatorade  Jell-O  Popsicles Coffee/tea without  Milk/creamer     YOU MAY NOT HAVE:  Solid foods  Milk and milk products  Juice with pulp    BOWEL PREPARATION: Includes: One (1) 238-gram container of Miralax (polyethylene glycol 3350), four (4) 5 mg Dulcolax (bisacodyl) tablets, and 64-ounces of Gatorade (sports drink). Preparation may be refrigerated. Entire bowel prep should be completed.    Afternoon before the procedure (2:00 pm - 5:00 pm):  Take two (2) 5 mg Dulcolax laxative tablets.    Evening before the procedure (6:00 pm):  Mix entire container of Miralax with 64-ounces of Gatorade and shake until all medication is dissolved.  Begin drinking solution. Drink an eight (8) ounce cup every 10-15 minutes until you have consumed half (32 ounces) of the solution. Refrigerate remaining solution.    Night before the procedure (8:00 pm):  Take two  (2) 5 mg Dulcolax laxative tablets.    Beginning 5 hours before your procedure:  Drink the remaining amount of prepared solution (32 ounces). Drink an eight (8) ounce cup every 10-15 minutes until you have consumed the remaining solution.      Bowel prep should be completed 4 hours prior to procedure time.  NOTHING TO EAT OR DRINK AFTER MIDNIGHT -EXCEPT YOUR BOWEL PREP                                                                                                                                                                                DAY OF THE PROCEDURE:  You may brush your teeth.  Leave all jewelry at home. Take out any facial piercings, if able.  Please arrive for your procedure as indicated by the OR / GI Lab / Endoscopy Unit. The hospital will contact you the day before with your exact arrival time.  Make sure you have arranged ahead of time for a responsible adult (18 or older) to accompany and drive you home after the procedure. Please discuss any transportation concerns with our staff prior to your procedure.  The effects of the anesthesia can persist for 24 hours. After receiving the sedation, you must exercise caution before engaging in any activity that could harm yourself and others (such as driving a car). Do not make any important decisions or do not drink any alcoholic beverages during this time period.  After your procedure, you may have anything you’d like to eat or drink. You will probably want to start with something light. Please include plenty of fluids. Avoid items that cause gas such as sodas and salads.      SPECIAL INSTRUCTIONS:    Prescribed medications:  Do not stop your aspirin, or any of your other medications (unless instructed otherwise).  Take the rest of your prescribed medications with small sips of water at least 2 hours prior to your procedure.      NOTHING TO EAT OR DRINK (INCLUDING CHEWING GUM) AFTER 12 MIDNIGHT PRIOR TO YOUR PROCEDURE EXCEPT YOUR BOWEL PREP.      For any  questions or concerns related to your bowel preparation or pre-procedure instructions, please contact our office.  Thank you for choosing [facility]!      [phone]

## 2025-03-05 NOTE — TELEPHONE ENCOUNTER
OV 3/5/25, rectal prolapse post-op, BMI 25    No known last FC    Scheduled FC 4/15/25, [facility] endo    PW handed to pt

## 2025-03-05 NOTE — ASSESSMENT & PLAN NOTE
Patient is status post EUA and reduction of rectal prolapse on 2/14/2025  He has had no recurrences since he was discharged from the hospital  Since that time, he had been maintaining a high-fiber diet and minimizing straining on the toilet  We discussed the etiology of rectal prolapse  I did recommend colonoscopy followed by laparoscopic robotic assisted ventral mesh rectopexy  However, at this time, patient reports minimal symptoms and would like to hold off on surgery  At the very least, we will schedule colonoscopy to rule out pathologic lead point  He may otherwise follow-up as needed  Orders:    Case request operating room: RECTOPEXY LAPAROSCOPIC W/ ROBOTICS; Standing

## 2025-04-15 ENCOUNTER — HOSPITAL ENCOUNTER (OUTPATIENT)
Dept: GASTROENTEROLOGY | Facility: HOSPITAL | Age: 40
Setting detail: OUTPATIENT SURGERY
Discharge: HOME | End: 2025-04-15
Attending: SURGERY
Payer: COMMERCIAL

## 2025-04-15 ENCOUNTER — ANESTHESIA EVENT (OUTPATIENT)
Dept: GASTROENTEROLOGY | Facility: HOSPITAL | Age: 40
End: 2025-04-15
Payer: COMMERCIAL

## 2025-04-15 ENCOUNTER — ANESTHESIA (OUTPATIENT)
Dept: GASTROENTEROLOGY | Facility: HOSPITAL | Age: 40
End: 2025-04-15
Payer: COMMERCIAL

## 2025-04-15 VITALS
SYSTOLIC BLOOD PRESSURE: 97 MMHG | RESPIRATION RATE: 15 BRPM | OXYGEN SATURATION: 100 % | TEMPERATURE: 97.4 F | BODY MASS INDEX: 25.07 KG/M2 | HEART RATE: 67 BPM | HEIGHT: 68 IN | WEIGHT: 165.4 LBS | DIASTOLIC BLOOD PRESSURE: 54 MMHG

## 2025-04-15 DIAGNOSIS — K62.3 RECTAL PROLAPSE: ICD-10-CM

## 2025-04-15 PROCEDURE — 45380 COLONOSCOPY AND BIOPSY: CPT | Performed by: SURGERY

## 2025-04-15 PROCEDURE — 88305 TISSUE EXAM BY PATHOLOGIST: CPT | Performed by: PATHOLOGY

## 2025-04-15 RX ORDER — LIDOCAINE HYDROCHLORIDE 10 MG/ML
INJECTION, SOLUTION EPIDURAL; INFILTRATION; INTRACAUDAL; PERINEURAL AS NEEDED
Status: DISCONTINUED | OUTPATIENT
Start: 2025-04-15 | End: 2025-04-15

## 2025-04-15 RX ORDER — DIPHENOXYLATE HYDROCHLORIDE AND ATROPINE SULFATE 2.5; .025 MG/1; MG/1
1 TABLET ORAL DAILY
COMMUNITY

## 2025-04-15 RX ORDER — SODIUM CHLORIDE, SODIUM LACTATE, POTASSIUM CHLORIDE, CALCIUM CHLORIDE 600; 310; 30; 20 MG/100ML; MG/100ML; MG/100ML; MG/100ML
INJECTION, SOLUTION INTRAVENOUS CONTINUOUS PRN
Status: DISCONTINUED | OUTPATIENT
Start: 2025-04-15 | End: 2025-04-15

## 2025-04-15 RX ORDER — PROPOFOL 10 MG/ML
INJECTION, EMULSION INTRAVENOUS AS NEEDED
Status: DISCONTINUED | OUTPATIENT
Start: 2025-04-15 | End: 2025-04-15

## 2025-04-15 RX ADMIN — PROPOFOL 50 MG: 10 INJECTION, EMULSION INTRAVENOUS at 08:44

## 2025-04-15 RX ADMIN — SODIUM CHLORIDE, SODIUM LACTATE, POTASSIUM CHLORIDE, AND CALCIUM CHLORIDE: .6; .31; .03; .02 INJECTION, SOLUTION INTRAVENOUS at 08:23

## 2025-04-15 RX ADMIN — PROPOFOL 50 MG: 10 INJECTION, EMULSION INTRAVENOUS at 08:39

## 2025-04-15 RX ADMIN — PROPOFOL 50 MG: 10 INJECTION, EMULSION INTRAVENOUS at 08:37

## 2025-04-15 RX ADMIN — LIDOCAINE HYDROCHLORIDE 50 MG: 10 INJECTION, SOLUTION EPIDURAL; INFILTRATION; INTRACAUDAL at 08:32

## 2025-04-15 RX ADMIN — PROPOFOL 50 MG: 10 INJECTION, EMULSION INTRAVENOUS at 08:47

## 2025-04-15 RX ADMIN — PROPOFOL 130 MG: 10 INJECTION, EMULSION INTRAVENOUS at 08:32

## 2025-04-15 RX ADMIN — PROPOFOL 20 MG: 10 INJECTION, EMULSION INTRAVENOUS at 08:34

## 2025-04-15 RX ADMIN — PROPOFOL 50 MG: 10 INJECTION, EMULSION INTRAVENOUS at 08:42

## 2025-04-15 RX ADMIN — PROPOFOL 50 MG: 10 INJECTION, EMULSION INTRAVENOUS at 08:50

## 2025-04-15 NOTE — ANESTHESIA PREPROCEDURE EVALUATION
Procedure:  COLONOSCOPY    Relevant Problems   ANESTHESIA   (-) History of anesthesia complications      CARDIO   (-) Chest pain   (-) CADET (dyspnea on exertion)      PULMONARY   (-) Shortness of breath   (-) Sleep apnea   (-) URI (upper respiratory infection)        Physical Exam    Airway    Mallampati score: II  TM Distance: >3 FB  Neck ROM: full     Dental       Cardiovascular      Pulmonary      Other Findings        Anesthesia Plan  ASA Score- 1     Anesthesia Type- IV sedation with anesthesia with ASA Monitors.         Additional Monitors:     Airway Plan:            Plan Factors-Exercise tolerance (METS): >4 METS.    Chart reviewed. EKG reviewed.  Existing labs reviewed. Patient summary reviewed.                  Induction- intravenous.    Postoperative Plan-         Informed Consent- Anesthetic plan and risks discussed with patient.  I personally reviewed this patient with the CRNA. Discussed and agreed on the Anesthesia Plan with the CRNA..      NPO Status:  Vitals Value Taken Time   Date of last liquid 04/15/25 04/15/25 0807   Time of last liquid 0400 04/15/25 0807   Date of last solid 04/14/25 04/15/25 0807   Time of last solid 1000 04/15/25 0807

## 2025-04-15 NOTE — ANESTHESIA POSTPROCEDURE EVALUATION
Post-Op Assessment Note    CV Status:  Stable  Pain Score: 0    Pain management: adequate       Mental Status:  Sleepy   Hydration Status:  Euvolemic   PONV Controlled:  Controlled   Airway Patency:  Patent     Post Op Vitals Reviewed: Yes    No anethesia notable event occurred.    Staff: Anesthesiologist, CRNA           Last Filed PACU Vitals:  Vitals Value Taken Time   Temp     Pulse 74 04/15/25 0854   BP 84/51 04/15/25 0854   Resp 12 04/15/25 0854   SpO2 100 % 04/15/25 0854

## 2025-04-15 NOTE — H&P
"History and Physical   Colon and Rectal Surgery   Harvey Bustos 40 y.o. male MRN: [id number]  Unit/Bed#:  Encounter: [id number]  04/15/25   @NOW    No chief complaint on file.        History of Present Illness   HPI:  Harvey Bustos is a 40 y.o. male who presents for colonoscopy.      Historical Information   Past Medical History:   Diagnosis Date    Rectal prolapse      Past Surgical History:   Procedure Laterality Date    CYST REMOVAL      EXAMINATION UNDER ANESTHESIA N/A 2/14/2025    Procedure: EXAM UNDER ANESTHESIA (EUA) REDUCTION OF RECTAL PROLAPSE;  Surgeon: Manjula Young MD;  Location: [facility];  Service: Colorectal    HERNIA REPAIR      [place] SIGMOIDOSCOPY FLX DX W/COLLJ SPEC BR/WA IF PFRMD N/A 2/14/2025    Procedure: SIGMOIDOSCOPY FLEXIBLE;  Surgeon: Manjula Young MD;  Location: [facility];  Service: Colorectal       Meds/Allergies     Not in a hospital admission.      Current Outpatient Medications:     multivitamin (THERAGRAN) TABS, Take 1 tablet by mouth daily, Disp: , Rfl:     No Known Allergies      Social History   Social History     Substance and Sexual Activity   Alcohol Use No     Social History     Substance and Sexual Activity   Drug Use No     Social History     Tobacco Use   Smoking Status Never   Smokeless Tobacco Never         Family History: No family history on file.      Objective     Current Vitals:   Blood Pressure: 103/65 (04/15/25 0810)  Pulse: 58 (04/15/25 0810)  Temperature: 97.6 °F (36.4 °C) (04/15/25 0810)  Temp Source: Temporal (04/15/25 0810)  Respirations: 18 (04/15/25 0810)  Height: 5' 8\" (172.7 cm) (04/15/25 0810)  Weight - Scale: 75 kg (165 lb 6.4 oz) (04/15/25 0810)  SpO2: 99 % (04/15/25 0810)  No intake or output data in the 24 hours ending 04/15/25 0822    Physical Exam:  General:  Resting comfortably in bed   Eyes:Sclera anicteric  ENT: Trachea midline  Pulm:  Symmetric chest raise.  No respiratory Distress  CV:  Regular on monitor  Abdomen:  Soft " NT ND  Extremities:  No clubbing/ cyanosis/ edema    Lab Results: I have personally reviewed pertinent lab results.    Imaging: Results Review Statement: No pertinent imaging studies reviewed.      ASSESSMENT:  Harvey Bustos is a 40 y.o. male who presents for outpatient colonoscopy.      PLAN:  For colonoscopy    Risks/ Benefits reviewed to include but not limited to anesthesia, bleeding, missed lesions, and colonoscopic perforation requiring surgery.

## 2025-04-17 PROCEDURE — 88305 TISSUE EXAM BY PATHOLOGIST: CPT | Performed by: PATHOLOGY

## 2025-04-18 ENCOUNTER — RESULTS FOLLOW-UP (OUTPATIENT)
Age: 40
End: 2025-04-18

## (undated) DEVICE — PREMIUM DRY TRAY LF: Brand: MEDLINE INDUSTRIES, INC.

## (undated) DEVICE — MEDI-VAC YANK SUCT HNDL W/TPRD BULBOUS TIP: Brand: CARDINAL HEALTH

## (undated) DEVICE — SUT PROLENE 2-0 KS 30 IN 8623H

## (undated) DEVICE — BETHLEHEM UNIVERSAL MINOR GEN: Brand: CARDINAL HEALTH

## (undated) DEVICE — LUBRICANT JELLY SURGILUBE TUBE 4OZ FLIP TOP

## (undated) DEVICE — SUT VICRYL 0 REEL 54 IN J287G

## (undated) DEVICE — ANTIBACTERIAL UNDYED BRAIDED (POLYGLACTIN 910), SYNTHETIC ABSORBABLE SUTURE: Brand: COATED VICRYL

## (undated) DEVICE — SUT VICRYL 3-0 PS-2 18 IN J497G

## (undated) DEVICE — POOLE SUCTION HANDLE: Brand: CARDINAL HEALTH

## (undated) DEVICE — VIOLET BRAIDED (POLYGLACTIN 910), SYNTHETIC ABSORBABLE SUTURE: Brand: COATED VICRYL

## (undated) DEVICE — SUT MONOCRYL 4-0 PS-2 18 IN Y496G

## (undated) DEVICE — PAD GROUNDING DUAL ADULT

## (undated) DEVICE — GAUZE SPONGES,16 PLY: Brand: CURITY

## (undated) DEVICE — Device: Brand: DEFENDO AIR/WATER/SUCTION AND BIOPSY VALVE

## (undated) DEVICE — GLOVE INDICATOR UNDERGLOVE SZ 6 BLUE

## (undated) DEVICE — POV-IOD SOLUTION 4OZ BT

## (undated) DEVICE — GLOVE SRG BIOGEL ECLIPSE 6

## (undated) DEVICE — TUBING SUCTION 5MM X 12 FT

## (undated) DEVICE — 3M™ IOBAN™ 2 ANTIMICROBIAL INCISE DRAPE 6650EZ: Brand: IOBAN™ 2

## (undated) DEVICE — CO2 AND WATER TUBING/CAP SET FOR OLYMPUS® SCOPES & UCR: Brand: ERBE

## (undated) DEVICE — NEEDLE 25G X 1 1/2

## (undated) DEVICE — BASIC SINGLE BASIN 2-LF: Brand: MEDLINE INDUSTRIES, INC.

## (undated) DEVICE — CHLORAPREP HI-LITE 26ML ORANGE

## (undated) DEVICE — FIRST STEP BEDSIDE KIT - STAND-UP POUCH, ENDOSCOPIC CLEANING PAD - 1 POUCH: Brand: FIRST STEP BEDSIDE KIT - STAND-UP POUCH, ENDOSCOPIC CLEANING PAD

## (undated) DEVICE — DISPOSABLE BRIEF/UNDERWEAR

## (undated) DEVICE — LIGACLIP MCA MULTIPLE CLIP APPLIERS, 20 MEDIUM CLIPS: Brand: LIGACLIP

## (undated) DEVICE — ADHESIVE SKIN HIGH VISCOSITY EXOFIN 1ML

## (undated) DEVICE — SYRINGE 10ML LL

## (undated) DEVICE — ELECTRODE NEEDLE MOD E-Z CLEAN 2.75IN 7CM -0013M

## (undated) DEVICE — PACK PBDS STERILE LAP LITHOTOMY RF

## (undated) DEVICE — PENCIL ELECTROSURG E-Z CLEAN -0035H

## (undated) DEVICE — INTENDED FOR TISSUE SEPARATION, AND OTHER PROCEDURES THAT REQUIRE A SHARP SURGICAL BLADE TO PUNCTURE OR CUT.: Brand: BARD-PARKER SAFETY BLADES SIZE 10, STERILE

## (undated) DEVICE — GELFOAM 100